# Patient Record
Sex: FEMALE | Race: WHITE | NOT HISPANIC OR LATINO
[De-identification: names, ages, dates, MRNs, and addresses within clinical notes are randomized per-mention and may not be internally consistent; named-entity substitution may affect disease eponyms.]

---

## 2017-01-04 ENCOUNTER — APPOINTMENT (OUTPATIENT)
Dept: RADIOLOGY | Facility: HOSPITAL | Age: 62
End: 2017-01-04

## 2017-01-04 ENCOUNTER — OUTPATIENT (OUTPATIENT)
Dept: OUTPATIENT SERVICES | Facility: HOSPITAL | Age: 62
LOS: 1 days | End: 2017-01-04
Payer: COMMERCIAL

## 2017-01-04 PROCEDURE — 73130 X-RAY EXAM OF HAND: CPT

## 2017-01-04 PROCEDURE — 73110 X-RAY EXAM OF WRIST: CPT

## 2017-01-04 PROCEDURE — 72100 X-RAY EXAM L-S SPINE 2/3 VWS: CPT

## 2017-01-04 PROCEDURE — 77080 DXA BONE DENSITY AXIAL: CPT

## 2017-01-17 ENCOUNTER — RESULT REVIEW (OUTPATIENT)
Age: 62
End: 2017-01-17

## 2017-01-18 ENCOUNTER — APPOINTMENT (OUTPATIENT)
Dept: OBGYN | Facility: CLINIC | Age: 62
End: 2017-01-18

## 2017-01-31 ENCOUNTER — APPOINTMENT (OUTPATIENT)
Dept: ORTHOPEDIC SURGERY | Facility: CLINIC | Age: 62
End: 2017-01-31

## 2017-01-31 RX ADMIN — LIDOCAINE HYDROCHLORIDE %: 10 INJECTION, SOLUTION INFILTRATION; PERINEURAL at 00:00

## 2017-01-31 RX ADMIN — METHYLPREDNISOLONE ACETATE MG/ML: 40 INJECTION, SUSPENSION INTRA-ARTICULAR; INTRALESIONAL; INTRAMUSCULAR; SOFT TISSUE at 00:00

## 2017-02-02 RX ORDER — METHYLPRED ACET/NACL,ISO-OS/PF 40 MG/ML
40 VIAL (ML) INJECTION
Qty: 1 | Refills: 0 | Status: COMPLETED | OUTPATIENT
Start: 2017-01-31

## 2017-02-02 RX ORDER — LIDOCAINE HYDROCHLORIDE 10 MG/ML
1 INJECTION, SOLUTION INFILTRATION; PERINEURAL
Refills: 0 | Status: COMPLETED | OUTPATIENT
Start: 2017-01-31

## 2017-02-27 ENCOUNTER — APPOINTMENT (OUTPATIENT)
Dept: MAMMOGRAPHY | Facility: HOSPITAL | Age: 62
End: 2017-02-27

## 2017-02-27 ENCOUNTER — OUTPATIENT (OUTPATIENT)
Dept: OUTPATIENT SERVICES | Facility: HOSPITAL | Age: 62
LOS: 1 days | End: 2017-02-27
Payer: COMMERCIAL

## 2017-02-27 ENCOUNTER — APPOINTMENT (OUTPATIENT)
Dept: ULTRASOUND IMAGING | Facility: HOSPITAL | Age: 62
End: 2017-02-27

## 2017-02-27 PROCEDURE — 76641 ULTRASOUND BREAST COMPLETE: CPT

## 2017-02-27 PROCEDURE — 77067 SCR MAMMO BI INCL CAD: CPT

## 2017-02-27 PROCEDURE — 77063 BREAST TOMOSYNTHESIS BI: CPT

## 2017-07-25 ENCOUNTER — APPOINTMENT (OUTPATIENT)
Dept: ORTHOPEDIC SURGERY | Facility: CLINIC | Age: 62
End: 2017-07-25
Payer: OTHER GOVERNMENT

## 2017-07-25 VITALS
HEIGHT: 63 IN | SYSTOLIC BLOOD PRESSURE: 118 MMHG | BODY MASS INDEX: 41.11 KG/M2 | WEIGHT: 232 LBS | HEART RATE: 76 BPM | DIASTOLIC BLOOD PRESSURE: 73 MMHG

## 2017-07-25 PROCEDURE — 99213 OFFICE O/P EST LOW 20 MIN: CPT | Mod: 25

## 2017-07-25 PROCEDURE — 73630 X-RAY EXAM OF FOOT: CPT | Mod: RT

## 2017-07-25 PROCEDURE — 20600 DRAIN/INJ JOINT/BURSA W/O US: CPT | Mod: RT

## 2017-11-17 ENCOUNTER — APPOINTMENT (OUTPATIENT)
Dept: ORTHOPEDIC SURGERY | Facility: CLINIC | Age: 62
End: 2017-11-17
Payer: OTHER GOVERNMENT

## 2017-11-17 VITALS
SYSTOLIC BLOOD PRESSURE: 152 MMHG | DIASTOLIC BLOOD PRESSURE: 85 MMHG | HEIGHT: 63 IN | BODY MASS INDEX: 41.11 KG/M2 | HEART RATE: 84 BPM | WEIGHT: 232 LBS

## 2017-11-17 PROCEDURE — 99214 OFFICE O/P EST MOD 30 MIN: CPT

## 2017-12-04 ENCOUNTER — APPOINTMENT (OUTPATIENT)
Dept: FAMILY MEDICINE | Facility: CLINIC | Age: 62
End: 2017-12-04
Payer: OTHER GOVERNMENT

## 2017-12-04 VITALS
WEIGHT: 224.5 LBS | HEART RATE: 83 BPM | DIASTOLIC BLOOD PRESSURE: 83 MMHG | BODY MASS INDEX: 39.78 KG/M2 | SYSTOLIC BLOOD PRESSURE: 112 MMHG | OXYGEN SATURATION: 97 % | RESPIRATION RATE: 16 BRPM | HEIGHT: 63 IN

## 2017-12-04 DIAGNOSIS — Z78.9 OTHER SPECIFIED HEALTH STATUS: ICD-10-CM

## 2017-12-04 DIAGNOSIS — Z87.891 PERSONAL HISTORY OF NICOTINE DEPENDENCE: ICD-10-CM

## 2017-12-04 DIAGNOSIS — Z87.09 PERSONAL HISTORY OF OTHER DISEASES OF THE RESPIRATORY SYSTEM: ICD-10-CM

## 2017-12-04 LAB — CYTOLOGY CVX/VAG DOC THIN PREP: NEGATIVE

## 2017-12-04 PROCEDURE — 99204 OFFICE O/P NEW MOD 45 MIN: CPT

## 2017-12-04 RX ORDER — FLUTICASONE PROPIONATE 50 UG/1
50 SPRAY, METERED NASAL
Qty: 48 | Refills: 0 | Status: COMPLETED | COMMUNITY
Start: 2017-06-05 | End: 2017-12-04

## 2017-12-07 PROBLEM — Z87.09 HISTORY OF ASTHMA: Status: RESOLVED | Noted: 2017-12-07 | Resolved: 2017-12-07

## 2017-12-16 ENCOUNTER — APPOINTMENT (OUTPATIENT)
Dept: ORTHOPEDIC SURGERY | Facility: CLINIC | Age: 62
End: 2017-12-16
Payer: OTHER GOVERNMENT

## 2017-12-16 DIAGNOSIS — M19.071 PRIMARY OSTEOARTHRITIS, RIGHT ANKLE AND FOOT: ICD-10-CM

## 2017-12-16 PROCEDURE — 20605 DRAIN/INJ JOINT/BURSA W/O US: CPT | Mod: RT

## 2017-12-16 PROCEDURE — 99214 OFFICE O/P EST MOD 30 MIN: CPT | Mod: 25

## 2017-12-16 RX ORDER — LIDOCAINE HYDROCHLORIDE 10 MG/ML
1 INJECTION, SOLUTION INFILTRATION; PERINEURAL
Refills: 0 | Status: COMPLETED | OUTPATIENT
Start: 2017-12-16

## 2017-12-16 RX ORDER — METHYLPRED ACET/NACL,ISO-OS/PF 40 MG/ML
40 VIAL (ML) INJECTION
Qty: 1 | Refills: 0 | Status: COMPLETED | OUTPATIENT
Start: 2017-12-16

## 2017-12-16 RX ADMIN — METHYLPREDNISOLONE ACETATE 0 MG/ML: 40 INJECTION, SUSPENSION INTRALESIONAL; INTRAMUSCULAR; INTRASYNOVIAL; SOFT TISSUE at 00:00

## 2017-12-16 RX ADMIN — LIDOCAINE HYDROCHLORIDE 0 %: 10 INJECTION, SOLUTION EPIDURAL; INFILTRATION; INTRACAUDAL; PERINEURAL at 00:00

## 2017-12-21 ENCOUNTER — RX RENEWAL (OUTPATIENT)
Age: 62
End: 2017-12-21

## 2018-01-08 ENCOUNTER — TRANSCRIPTION ENCOUNTER (OUTPATIENT)
Age: 63
End: 2018-01-08

## 2018-01-08 ENCOUNTER — RX RENEWAL (OUTPATIENT)
Age: 63
End: 2018-01-08

## 2018-02-16 ENCOUNTER — TRANSCRIPTION ENCOUNTER (OUTPATIENT)
Age: 63
End: 2018-02-16

## 2018-02-26 ENCOUNTER — APPOINTMENT (OUTPATIENT)
Dept: FAMILY MEDICINE | Facility: CLINIC | Age: 63
End: 2018-02-26

## 2018-02-26 ENCOUNTER — RX RENEWAL (OUTPATIENT)
Age: 63
End: 2018-02-26

## 2018-02-26 ENCOUNTER — APPOINTMENT (OUTPATIENT)
Dept: FAMILY MEDICINE | Facility: CLINIC | Age: 63
End: 2018-02-26
Payer: OTHER GOVERNMENT

## 2018-02-26 VITALS
SYSTOLIC BLOOD PRESSURE: 132 MMHG | BODY MASS INDEX: 39.54 KG/M2 | HEIGHT: 63 IN | OXYGEN SATURATION: 96 % | DIASTOLIC BLOOD PRESSURE: 84 MMHG | HEART RATE: 92 BPM | RESPIRATION RATE: 16 BRPM | WEIGHT: 223.13 LBS

## 2018-02-26 DIAGNOSIS — J30.9 ALLERGIC RHINITIS, UNSPECIFIED: ICD-10-CM

## 2018-02-26 LAB
BASOPHILS # BLD AUTO: 0.08 K/UL
BASOPHILS NFR BLD AUTO: 1.4 %
EOSINOPHIL # BLD AUTO: 0.09 K/UL
EOSINOPHIL NFR BLD AUTO: 1.5 %
HCT VFR BLD CALC: 39.2 %
HGB BLD-MCNC: 13.1 G/DL
IMM GRANULOCYTES NFR BLD AUTO: 0.2 %
LYMPHOCYTES # BLD AUTO: 1.56 K/UL
LYMPHOCYTES NFR BLD AUTO: 26.8 %
MAN DIFF?: NORMAL
MCHC RBC-ENTMCNC: 33 PG
MCHC RBC-ENTMCNC: 33.4 GM/DL
MCV RBC AUTO: 98.7 FL
MONOCYTES # BLD AUTO: 0.59 K/UL
MONOCYTES NFR BLD AUTO: 10.1 %
NEUTROPHILS # BLD AUTO: 3.49 K/UL
NEUTROPHILS NFR BLD AUTO: 60 %
PLATELET # BLD AUTO: 232 K/UL
RBC # BLD: 3.97 M/UL
RBC # FLD: 12.7 %
WBC # FLD AUTO: 5.82 K/UL

## 2018-02-26 PROCEDURE — 99214 OFFICE O/P EST MOD 30 MIN: CPT

## 2018-02-26 RX ORDER — OMEPRAZOLE 20 MG/1
20 CAPSULE, DELAYED RELEASE ORAL
Qty: 90 | Refills: 0 | Status: DISCONTINUED | COMMUNITY
Start: 2017-05-22 | End: 2018-02-26

## 2018-02-28 ENCOUNTER — APPOINTMENT (OUTPATIENT)
Dept: ULTRASOUND IMAGING | Facility: HOSPITAL | Age: 63
End: 2018-02-28
Payer: OTHER GOVERNMENT

## 2018-02-28 ENCOUNTER — OUTPATIENT (OUTPATIENT)
Dept: OUTPATIENT SERVICES | Facility: HOSPITAL | Age: 63
LOS: 1 days | End: 2018-02-28
Payer: COMMERCIAL

## 2018-02-28 ENCOUNTER — APPOINTMENT (OUTPATIENT)
Dept: MAMMOGRAPHY | Facility: HOSPITAL | Age: 63
End: 2018-02-28
Payer: OTHER GOVERNMENT

## 2018-02-28 DIAGNOSIS — Z00.8 ENCOUNTER FOR OTHER GENERAL EXAMINATION: ICD-10-CM

## 2018-02-28 PROCEDURE — 76641 ULTRASOUND BREAST COMPLETE: CPT | Mod: 26

## 2018-02-28 PROCEDURE — 77067 SCR MAMMO BI INCL CAD: CPT

## 2018-02-28 PROCEDURE — 77067 SCR MAMMO BI INCL CAD: CPT | Mod: 26

## 2018-02-28 PROCEDURE — 77063 BREAST TOMOSYNTHESIS BI: CPT | Mod: 26

## 2018-02-28 PROCEDURE — 77063 BREAST TOMOSYNTHESIS BI: CPT

## 2018-02-28 PROCEDURE — 76641 ULTRASOUND BREAST COMPLETE: CPT

## 2018-03-24 ENCOUNTER — RX RENEWAL (OUTPATIENT)
Age: 63
End: 2018-03-24

## 2018-03-24 LAB
25(OH)D3 SERPL-MCNC: 42.3 NG/ML
ALBUMIN SERPL ELPH-MCNC: 4.6 G/DL
ALP BLD-CCNC: 40 U/L
ALT SERPL-CCNC: 21 U/L
ANION GAP SERPL CALC-SCNC: 17 MMOL/L
AST SERPL-CCNC: 21 U/L
BILIRUB SERPL-MCNC: 0.3 MG/DL
BUN SERPL-MCNC: 16 MG/DL
CALCIUM SERPL-MCNC: 9.6 MG/DL
CHLORIDE SERPL-SCNC: 97 MMOL/L
CHOLEST SERPL-MCNC: 254 MG/DL
CHOLEST/HDLC SERPL: 2.9 RATIO
CO2 SERPL-SCNC: 23 MMOL/L
CREAT SERPL-MCNC: 0.77 MG/DL
GLUCOSE SERPL-MCNC: 97 MG/DL
HBA1C MFR BLD HPLC: 5.4 %
HDLC SERPL-MCNC: 89 MG/DL
LDLC SERPL CALC-MCNC: 112 MG/DL
POTASSIUM SERPL-SCNC: 4.2 MMOL/L
PROT SERPL-MCNC: 7.8 G/DL
SODIUM SERPL-SCNC: 137 MMOL/L
TRIGL SERPL-MCNC: 266 MG/DL
TSH SERPL-ACNC: 1.23 UIU/ML

## 2018-03-27 ENCOUNTER — RESULT REVIEW (OUTPATIENT)
Age: 63
End: 2018-03-27

## 2018-03-27 ENCOUNTER — APPOINTMENT (OUTPATIENT)
Dept: OBGYN | Facility: CLINIC | Age: 63
End: 2018-03-27
Payer: OTHER GOVERNMENT

## 2018-03-27 PROCEDURE — G0101: CPT

## 2018-05-15 ENCOUNTER — APPOINTMENT (OUTPATIENT)
Dept: ORTHOPEDIC SURGERY | Facility: CLINIC | Age: 63
End: 2018-05-15
Payer: OTHER GOVERNMENT

## 2018-05-15 VITALS
BODY MASS INDEX: 39.51 KG/M2 | HEART RATE: 73 BPM | HEIGHT: 63 IN | DIASTOLIC BLOOD PRESSURE: 71 MMHG | WEIGHT: 223 LBS | SYSTOLIC BLOOD PRESSURE: 119 MMHG

## 2018-05-15 PROCEDURE — 20605 DRAIN/INJ JOINT/BURSA W/O US: CPT | Mod: RT

## 2018-05-15 PROCEDURE — 99213 OFFICE O/P EST LOW 20 MIN: CPT | Mod: 25

## 2018-05-15 RX ADMIN — LIDOCAINE HYDROCHLORIDE %: 10 INJECTION, SOLUTION INFILTRATION; PERINEURAL at 00:00

## 2018-05-15 RX ADMIN — METHYLPREDNISOLONE ACETATE MG/ML: 40 INJECTION, SUSPENSION INTRA-ARTICULAR; INTRALESIONAL; INTRAMUSCULAR; SOFT TISSUE at 00:00

## 2018-05-18 RX ORDER — METHYLPRED ACET/NACL,ISO-OS/PF 40 MG/ML
40 VIAL (ML) INJECTION
Qty: 1 | Refills: 0 | Status: COMPLETED | OUTPATIENT
Start: 2018-05-15

## 2018-05-18 RX ORDER — LIDOCAINE HYDROCHLORIDE 10 MG/ML
1 INJECTION, SOLUTION INFILTRATION; PERINEURAL
Refills: 0 | Status: COMPLETED | OUTPATIENT
Start: 2018-05-15

## 2018-05-28 ENCOUNTER — INPATIENT (INPATIENT)
Facility: HOSPITAL | Age: 63
LOS: 5 days | Discharge: HOME | DRG: 392 | End: 2018-06-03
Attending: INTERNAL MEDICINE | Admitting: FAMILY MEDICINE
Payer: COMMERCIAL

## 2018-05-28 DIAGNOSIS — K57.92 DIVERTICULITIS OF INTESTINE, PART UNSPECIFIED, WITHOUT PERFORATION OR ABSCESS WITHOUT BLEEDING: ICD-10-CM

## 2018-05-29 DIAGNOSIS — K92.2 GASTROINTESTINAL HEMORRHAGE, UNSPECIFIED: ICD-10-CM

## 2018-05-29 PROCEDURE — 99223 1ST HOSP IP/OBS HIGH 75: CPT

## 2018-05-29 PROCEDURE — 74177 CT ABD & PELVIS W/CONTRAST: CPT | Mod: 26

## 2018-05-29 PROCEDURE — 93010 ELECTROCARDIOGRAM REPORT: CPT

## 2018-05-29 PROCEDURE — 71045 X-RAY EXAM CHEST 1 VIEW: CPT | Mod: 26

## 2018-05-30 PROCEDURE — 99233 SBSQ HOSP IP/OBS HIGH 50: CPT

## 2018-05-31 PROCEDURE — 99233 SBSQ HOSP IP/OBS HIGH 50: CPT

## 2018-06-01 PROCEDURE — 99233 SBSQ HOSP IP/OBS HIGH 50: CPT

## 2018-06-01 PROCEDURE — 99223 1ST HOSP IP/OBS HIGH 75: CPT

## 2018-06-02 PROCEDURE — 99233 SBSQ HOSP IP/OBS HIGH 50: CPT

## 2018-06-03 PROCEDURE — 83690 ASSAY OF LIPASE: CPT

## 2018-06-03 PROCEDURE — 85018 HEMOGLOBIN: CPT

## 2018-06-03 PROCEDURE — 86850 RBC ANTIBODY SCREEN: CPT

## 2018-06-03 PROCEDURE — 82150 ASSAY OF AMYLASE: CPT

## 2018-06-03 PROCEDURE — 80076 HEPATIC FUNCTION PANEL: CPT

## 2018-06-03 PROCEDURE — 96374 THER/PROPH/DIAG INJ IV PUSH: CPT

## 2018-06-03 PROCEDURE — 80053 COMPREHEN METABOLIC PANEL: CPT

## 2018-06-03 PROCEDURE — 93005 ELECTROCARDIOGRAM TRACING: CPT

## 2018-06-03 PROCEDURE — 85027 COMPLETE CBC AUTOMATED: CPT

## 2018-06-03 PROCEDURE — 85610 PROTHROMBIN TIME: CPT

## 2018-06-03 PROCEDURE — 84100 ASSAY OF PHOSPHORUS: CPT

## 2018-06-03 PROCEDURE — 85014 HEMATOCRIT: CPT

## 2018-06-03 PROCEDURE — 86900 BLOOD TYPING SEROLOGIC ABO: CPT

## 2018-06-03 PROCEDURE — 96361 HYDRATE IV INFUSION ADD-ON: CPT

## 2018-06-03 PROCEDURE — 99239 HOSP IP/OBS DSCHRG MGMT >30: CPT

## 2018-06-03 PROCEDURE — 99285 EMERGENCY DEPT VISIT HI MDM: CPT | Mod: 25

## 2018-06-03 PROCEDURE — 85730 THROMBOPLASTIN TIME PARTIAL: CPT

## 2018-06-03 PROCEDURE — 83735 ASSAY OF MAGNESIUM: CPT

## 2018-06-03 PROCEDURE — 71045 X-RAY EXAM CHEST 1 VIEW: CPT

## 2018-06-03 PROCEDURE — 96375 TX/PRO/DX INJ NEW DRUG ADDON: CPT

## 2018-06-03 PROCEDURE — 99232 SBSQ HOSP IP/OBS MODERATE 35: CPT

## 2018-06-03 PROCEDURE — 82272 OCCULT BLD FECES 1-3 TESTS: CPT

## 2018-06-03 PROCEDURE — 74177 CT ABD & PELVIS W/CONTRAST: CPT

## 2018-06-03 PROCEDURE — 80048 BASIC METABOLIC PNL TOTAL CA: CPT

## 2018-06-06 ENCOUNTER — APPOINTMENT (OUTPATIENT)
Dept: FAMILY MEDICINE | Facility: CLINIC | Age: 63
End: 2018-06-06
Payer: OTHER GOVERNMENT

## 2018-06-06 VITALS
OXYGEN SATURATION: 96 % | SYSTOLIC BLOOD PRESSURE: 130 MMHG | HEART RATE: 73 BPM | DIASTOLIC BLOOD PRESSURE: 84 MMHG | BODY MASS INDEX: 39.34 KG/M2 | WEIGHT: 222.06 LBS | RESPIRATION RATE: 14 BRPM | HEIGHT: 63 IN

## 2018-06-06 PROCEDURE — 99214 OFFICE O/P EST MOD 30 MIN: CPT

## 2018-06-11 NOTE — COUNSELING
[Weight management counseling provided] : Weight management [Healthy eating counseling provided] : healthy eating [Activity counseling provided] : activity [Engage in a relaxing activity] : Engage in a relaxing activity

## 2018-06-11 NOTE — HISTORY OF PRESENT ILLNESS
[FreeTextEntry1] : Patient presents for f/u hospitalization at [facility] due tocolitis, admitted 5/29 memorial day d/c until Sunday. Now on low residue diet. Slade saw in hospital, will f/u 6/14/18. Last colonoscopy 2016 [facility], will try Slade now. \par flagyl, cipro prescribed. Feeling better now, no fevers. Some diarrhea (less than before). Tired eliminating night shade vegetables without improvement x 1 month. Enjoys going to their home in [place] with , they plan to retire there. Blood work and imaging from hospital reviewed with patient during visit. ALT elevated, will repeat. \par \par ROS: negative except as noted above.\par cipro-risk of tendon rupture d/w patient, if sx see doctor immediately or ER\par \par oral one more day\par \par tried eliminating night shades  x 1 month without change in symptoms. 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 His agueda uses it as a source of income. \par \par still having chronic diarrhea, has started iron at home again,advised to continue. will f/u with slade

## 2018-06-11 NOTE — PHYSICAL EXAM
[No Acute Distress] : no acute distress [Well Nourished] : well nourished [Well Developed] : well developed [Well-Appearing] : well-appearing [Normal Sclera/Conjunctiva] : normal sclera/conjunctiva [No Respiratory Distress] : no respiratory distress  [Clear to Auscultation] : lungs were clear to auscultation bilaterally [No Accessory Muscle Use] : no accessory muscle use [Normal Rate] : normal rate  [Regular Rhythm] : with a regular rhythm [Normal S1, S2] : normal S1 and S2 [Soft] : abdomen soft [Non-distended] : non-distended [No Masses] : no abdominal mass palpated [No HSM] : no HSM [Speech Grossly Normal] : speech grossly normal [Memory Grossly Normal] : memory grossly normal [Normal Affect] : the affect was normal [Alert and Oriented x3] : oriented to person, place, and time [Normal Insight/Judgement] : insight and judgment were intact [de-identified] : mild tenderness to deep palpation LLQ

## 2018-07-24 ENCOUNTER — APPOINTMENT (OUTPATIENT)
Dept: SURGERY | Facility: CLINIC | Age: 63
End: 2018-07-24
Payer: OTHER GOVERNMENT

## 2018-07-24 VITALS
SYSTOLIC BLOOD PRESSURE: 160 MMHG | OXYGEN SATURATION: 99 % | DIASTOLIC BLOOD PRESSURE: 86 MMHG | RESPIRATION RATE: 15 BRPM | TEMPERATURE: 97.4 F | HEART RATE: 97 BPM

## 2018-07-24 DIAGNOSIS — Z87.19 PERSONAL HISTORY OF OTHER DISEASES OF THE DIGESTIVE SYSTEM: ICD-10-CM

## 2018-07-24 PROCEDURE — 99202 OFFICE O/P NEW SF 15 MIN: CPT | Mod: 25

## 2018-07-24 PROCEDURE — 10061 I&D ABSCESS COMP/MULTIPLE: CPT

## 2018-07-24 RX ORDER — MAGNESIUM OXIDE 500 MG
500 TABLET ORAL
Refills: 0 | Status: ACTIVE | COMMUNITY

## 2018-07-24 RX ORDER — FERROUS SULFATE 325(65) MG
325 (65 FE) TABLET ORAL
Refills: 0 | Status: ACTIVE | COMMUNITY

## 2018-07-24 RX ORDER — ESTRADIOL 1 MG/1
1 TABLET ORAL
Qty: 30 | Refills: 0 | Status: ACTIVE | COMMUNITY
Start: 2018-06-26

## 2018-07-24 RX ORDER — BACILLUS COAGULANS/INULIN 1B-250 MG
CAPSULE ORAL
Refills: 0 | Status: ACTIVE | COMMUNITY

## 2018-07-24 RX ORDER — METRONIDAZOLE 500 MG/1
500 TABLET ORAL
Qty: 12 | Refills: 0 | Status: DISCONTINUED | COMMUNITY
Start: 2018-06-03

## 2018-07-24 RX ORDER — MULTIVIT-MIN/IRON/FOLIC ACID/K 18-600-40
500 CAPSULE ORAL
Refills: 0 | Status: ACTIVE | COMMUNITY

## 2018-07-24 RX ORDER — SULFAMETHOXAZOLE AND TRIMETHOPRIM 800; 160 MG/1; MG/1
800-160 TABLET ORAL
Qty: 14 | Refills: 0 | Status: DISCONTINUED | COMMUNITY
Start: 2018-07-17

## 2018-07-24 RX ORDER — ASPIRIN ENTERIC COATED TABLETS 81 MG 81 MG/1
81 TABLET, DELAYED RELEASE ORAL
Refills: 0 | Status: ACTIVE | COMMUNITY

## 2018-07-24 RX ORDER — CIPROFLOXACIN HYDROCHLORIDE 500 MG/1
500 TABLET, FILM COATED ORAL
Qty: 8 | Refills: 0 | Status: DISCONTINUED | COMMUNITY
Start: 2018-06-03

## 2018-07-31 ENCOUNTER — APPOINTMENT (OUTPATIENT)
Dept: SURGERY | Facility: CLINIC | Age: 63
End: 2018-07-31
Payer: MEDICARE

## 2018-07-31 VITALS
DIASTOLIC BLOOD PRESSURE: 88 MMHG | OXYGEN SATURATION: 98 % | HEART RATE: 74 BPM | TEMPERATURE: 97.7 F | RESPIRATION RATE: 16 BRPM | SYSTOLIC BLOOD PRESSURE: 140 MMHG

## 2018-07-31 DIAGNOSIS — L02.91 CUTANEOUS ABSCESS, UNSPECIFIED: ICD-10-CM

## 2018-07-31 PROCEDURE — 99024 POSTOP FOLLOW-UP VISIT: CPT

## 2018-07-31 RX ORDER — FLUTICASONE PROPIONATE 0.05 MG/G
0.01 OINTMENT TOPICAL
Qty: 30 | Refills: 0 | Status: DISCONTINUED | COMMUNITY
Start: 2017-07-25 | End: 2018-07-31

## 2018-08-27 LAB
ALP BLD-CCNC: 44 U/L
ALT SERPL-CCNC: 14 U/L
AST SERPL-CCNC: 19 U/L

## 2018-09-07 ENCOUNTER — APPOINTMENT (OUTPATIENT)
Dept: FAMILY MEDICINE | Facility: CLINIC | Age: 63
End: 2018-09-07
Payer: OTHER GOVERNMENT

## 2018-09-07 VITALS
OXYGEN SATURATION: 96 % | BODY MASS INDEX: 40.12 KG/M2 | SYSTOLIC BLOOD PRESSURE: 124 MMHG | WEIGHT: 226.44 LBS | DIASTOLIC BLOOD PRESSURE: 70 MMHG | HEART RATE: 79 BPM | HEIGHT: 63 IN | RESPIRATION RATE: 16 BRPM

## 2018-09-07 DIAGNOSIS — E03.9 HYPOTHYROIDISM, UNSPECIFIED: ICD-10-CM

## 2018-09-07 DIAGNOSIS — E78.1 PURE HYPERGLYCERIDEMIA: ICD-10-CM

## 2018-09-07 DIAGNOSIS — Z86.39 PERSONAL HISTORY OF OTHER ENDOCRINE, NUTRITIONAL AND METABOLIC DISEASE: ICD-10-CM

## 2018-09-07 PROCEDURE — 99214 OFFICE O/P EST MOD 30 MIN: CPT

## 2018-09-10 PROBLEM — Z86.39 HISTORY OF OBESITY: Status: RESOLVED | Noted: 2018-02-27 | Resolved: 2018-09-10

## 2018-09-10 NOTE — PHYSICAL EXAM
[No Acute Distress] : no acute distress [Well Nourished] : well nourished [Well Developed] : well developed [Well-Appearing] : well-appearing [Normal Voice/Communication] : normal voice/communication [Normal Sclera/Conjunctiva] : normal sclera/conjunctiva [Normal Outer Ear/Nose] : the outer ears and nose were normal in appearance [No Respiratory Distress] : no respiratory distress  [Clear to Auscultation] : lungs were clear to auscultation bilaterally [No Accessory Muscle Use] : no accessory muscle use [Normal Rate] : normal rate  [Regular Rhythm] : with a regular rhythm [Normal S1, S2] : normal S1 and S2 [de-identified] : +small 0.5 cm indurated area under right breast, minimal post inflammatory hyperpigmentation, min tenderness

## 2018-09-10 NOTE — ASSESSMENT
[FreeTextEntry1] : patient to start insurance run weight loss program x 1 year, had lost weight in past on weight watchers, hopeful\par RTO 4-6 months for f/u hypertriglyceridemia, HTN wt f/u

## 2018-09-10 NOTE — HISTORY OF PRESENT ILLNESS
[FreeTextEntry1] : Patient presents for f/u appointment, f/u HTN, obesity . Feels well. Spent much of the summer in [place]. Had an abscess under right breast, started while still in [place]. Was given sulfa abx by doctor up there, then saw breast surgeon here, was drained.  Still slightly tender to palpation. No fever, no chills.  Has house in [place], [place], planning to move there permanently in 2020. \par \par ROS: negative except as noted above\par

## 2018-10-23 ENCOUNTER — RX RENEWAL (OUTPATIENT)
Age: 63
End: 2018-10-23

## 2018-10-24 ENCOUNTER — TRANSCRIPTION ENCOUNTER (OUTPATIENT)
Age: 63
End: 2018-10-24

## 2018-11-06 ENCOUNTER — APPOINTMENT (OUTPATIENT)
Dept: ORTHOPEDIC SURGERY | Facility: CLINIC | Age: 63
End: 2018-11-06
Payer: OTHER GOVERNMENT

## 2018-11-06 VITALS — SYSTOLIC BLOOD PRESSURE: 137 MMHG | HEART RATE: 83 BPM | DIASTOLIC BLOOD PRESSURE: 85 MMHG

## 2018-11-06 PROCEDURE — 20600 DRAIN/INJ JOINT/BURSA W/O US: CPT | Mod: RT

## 2018-11-06 PROCEDURE — 99214 OFFICE O/P EST MOD 30 MIN: CPT | Mod: 25

## 2018-11-06 PROCEDURE — 73630 X-RAY EXAM OF FOOT: CPT | Mod: RT

## 2019-01-08 ENCOUNTER — APPOINTMENT (OUTPATIENT)
Dept: RADIOLOGY | Facility: HOSPITAL | Age: 64
End: 2019-01-08
Payer: COMMERCIAL

## 2019-01-08 ENCOUNTER — OUTPATIENT (OUTPATIENT)
Dept: OUTPATIENT SERVICES | Facility: HOSPITAL | Age: 64
LOS: 1 days | End: 2019-01-08
Payer: COMMERCIAL

## 2019-01-08 ENCOUNTER — APPOINTMENT (OUTPATIENT)
Dept: MRI IMAGING | Facility: HOSPITAL | Age: 64
End: 2019-01-08
Payer: COMMERCIAL

## 2019-01-08 DIAGNOSIS — Z00.8 ENCOUNTER FOR OTHER GENERAL EXAMINATION: ICD-10-CM

## 2019-01-08 PROCEDURE — 72148 MRI LUMBAR SPINE W/O DYE: CPT

## 2019-01-08 PROCEDURE — 72040 X-RAY EXAM NECK SPINE 2-3 VW: CPT

## 2019-01-08 PROCEDURE — 72148 MRI LUMBAR SPINE W/O DYE: CPT | Mod: 26

## 2019-01-08 PROCEDURE — 72040 X-RAY EXAM NECK SPINE 2-3 VW: CPT | Mod: 26

## 2019-01-09 ENCOUNTER — RX RENEWAL (OUTPATIENT)
Age: 64
End: 2019-01-09

## 2019-01-22 ENCOUNTER — OUTPATIENT (OUTPATIENT)
Dept: OUTPATIENT SERVICES | Facility: HOSPITAL | Age: 64
LOS: 1 days | End: 2019-01-22
Payer: COMMERCIAL

## 2019-01-22 ENCOUNTER — APPOINTMENT (OUTPATIENT)
Dept: MRI IMAGING | Facility: HOSPITAL | Age: 64
End: 2019-01-22
Payer: OTHER GOVERNMENT

## 2019-01-22 DIAGNOSIS — M54.12 RADICULOPATHY, CERVICAL REGION: ICD-10-CM

## 2019-01-22 PROCEDURE — 72141 MRI NECK SPINE W/O DYE: CPT

## 2019-01-22 PROCEDURE — 72141 MRI NECK SPINE W/O DYE: CPT | Mod: 26

## 2019-02-20 ENCOUNTER — RX RENEWAL (OUTPATIENT)
Age: 64
End: 2019-02-20

## 2019-02-27 LAB — TSH SERPL-ACNC: 1.89 UIU/ML

## 2019-02-28 ENCOUNTER — TRANSCRIPTION ENCOUNTER (OUTPATIENT)
Age: 64
End: 2019-02-28

## 2019-03-13 ENCOUNTER — APPOINTMENT (OUTPATIENT)
Dept: FAMILY MEDICINE | Facility: CLINIC | Age: 64
End: 2019-03-13
Payer: OTHER GOVERNMENT

## 2019-03-13 VITALS
DIASTOLIC BLOOD PRESSURE: 68 MMHG | HEART RATE: 88 BPM | BODY MASS INDEX: 40.98 KG/M2 | SYSTOLIC BLOOD PRESSURE: 120 MMHG | WEIGHT: 231.31 LBS | OXYGEN SATURATION: 95 % | RESPIRATION RATE: 16 BRPM | HEIGHT: 63 IN

## 2019-03-13 DIAGNOSIS — E78.5 HYPERLIPIDEMIA, UNSPECIFIED: ICD-10-CM

## 2019-03-13 DIAGNOSIS — I10 ESSENTIAL (PRIMARY) HYPERTENSION: ICD-10-CM

## 2019-03-13 LAB
CHOLEST SERPL-MCNC: 276 MG/DL
CHOLEST/HDLC SERPL: 2.5 RATIO
HDLC SERPL-MCNC: 110 MG/DL
LDLC SERPL CALC-MCNC: 137 MG/DL
TRIGL SERPL-MCNC: 146 MG/DL

## 2019-03-13 PROCEDURE — 99214 OFFICE O/P EST MOD 30 MIN: CPT

## 2019-03-14 ENCOUNTER — INPATIENT (INPATIENT)
Facility: HOSPITAL | Age: 64
LOS: 4 days | Discharge: HOME | DRG: 872 | End: 2019-03-19
Attending: INTERNAL MEDICINE | Admitting: HOSPITALIST
Payer: COMMERCIAL

## 2019-03-14 VITALS
SYSTOLIC BLOOD PRESSURE: 163 MMHG | HEART RATE: 103 BPM | RESPIRATION RATE: 18 BRPM | HEIGHT: 63 IN | OXYGEN SATURATION: 97 % | TEMPERATURE: 98 F | WEIGHT: 225.97 LBS | DIASTOLIC BLOOD PRESSURE: 90 MMHG

## 2019-03-14 DIAGNOSIS — K57.92 DIVERTICULITIS OF INTESTINE, PART UNSPECIFIED, WITHOUT PERFORATION OR ABSCESS WITHOUT BLEEDING: ICD-10-CM

## 2019-03-14 LAB
ALBUMIN SERPL ELPH-MCNC: 3.8 G/DL — SIGNIFICANT CHANGE UP (ref 3.3–5)
ALP SERPL-CCNC: 66 U/L — SIGNIFICANT CHANGE UP (ref 40–120)
ALT FLD-CCNC: 37 U/L DA — SIGNIFICANT CHANGE UP (ref 10–45)
ANION GAP SERPL CALC-SCNC: 17 MMOL/L — SIGNIFICANT CHANGE UP (ref 5–17)
AST SERPL-CCNC: 24 U/L — SIGNIFICANT CHANGE UP (ref 10–40)
BASOPHILS # BLD AUTO: 0.1 K/UL — SIGNIFICANT CHANGE UP (ref 0–0.2)
BASOPHILS NFR BLD AUTO: 1.1 % — SIGNIFICANT CHANGE UP (ref 0–2)
BILIRUB SERPL-MCNC: 0.4 MG/DL — SIGNIFICANT CHANGE UP (ref 0.2–1.2)
BUN SERPL-MCNC: 20 MG/DL — SIGNIFICANT CHANGE UP (ref 7–23)
CALCIUM SERPL-MCNC: 10.3 MG/DL — SIGNIFICANT CHANGE UP (ref 8.4–10.5)
CHLORIDE SERPL-SCNC: 101 MMOL/L — SIGNIFICANT CHANGE UP (ref 96–108)
CO2 SERPL-SCNC: 23 MMOL/L — SIGNIFICANT CHANGE UP (ref 22–31)
CREAT SERPL-MCNC: 0.87 MG/DL — SIGNIFICANT CHANGE UP (ref 0.5–1.3)
EOSINOPHIL # BLD AUTO: 0 K/UL — SIGNIFICANT CHANGE UP (ref 0–0.5)
EOSINOPHIL NFR BLD AUTO: 0.3 % — SIGNIFICANT CHANGE UP (ref 0–6)
GLUCOSE SERPL-MCNC: 107 MG/DL — HIGH (ref 70–99)
HCT VFR BLD CALC: 41 % — SIGNIFICANT CHANGE UP (ref 34.5–45)
HGB BLD-MCNC: 13.8 G/DL — SIGNIFICANT CHANGE UP (ref 11.5–15.5)
LYMPHOCYTES # BLD AUTO: 1.3 K/UL — SIGNIFICANT CHANGE UP (ref 1–3.3)
LYMPHOCYTES # BLD AUTO: 10.5 % — LOW (ref 13–44)
MCHC RBC-ENTMCNC: 33.4 PG — SIGNIFICANT CHANGE UP (ref 27–34)
MCHC RBC-ENTMCNC: 33.7 GM/DL — SIGNIFICANT CHANGE UP (ref 32–36)
MCV RBC AUTO: 99.1 FL — SIGNIFICANT CHANGE UP (ref 80–100)
MONOCYTES # BLD AUTO: 0.6 K/UL — SIGNIFICANT CHANGE UP (ref 0–0.9)
MONOCYTES NFR BLD AUTO: 5.4 % — SIGNIFICANT CHANGE UP (ref 2–14)
NEUTROPHILS # BLD AUTO: 9.9 K/UL — HIGH (ref 1.8–7.4)
NEUTROPHILS NFR BLD AUTO: 82.7 % — HIGH (ref 43–77)
PLATELET # BLD AUTO: 222 K/UL — SIGNIFICANT CHANGE UP (ref 150–400)
POTASSIUM SERPL-MCNC: 4 MMOL/L — SIGNIFICANT CHANGE UP (ref 3.5–5.3)
POTASSIUM SERPL-SCNC: 4 MMOL/L — SIGNIFICANT CHANGE UP (ref 3.5–5.3)
PROT SERPL-MCNC: 8.5 G/DL — HIGH (ref 6–8.3)
RBC # BLD: 4.14 M/UL — SIGNIFICANT CHANGE UP (ref 3.8–5.2)
RBC # FLD: 11.6 % — SIGNIFICANT CHANGE UP (ref 10.3–14.5)
SODIUM SERPL-SCNC: 141 MMOL/L — SIGNIFICANT CHANGE UP (ref 135–145)
WBC # BLD: 12 K/UL — HIGH (ref 3.8–10.5)
WBC # FLD AUTO: 12 K/UL — HIGH (ref 3.8–10.5)

## 2019-03-14 PROCEDURE — 74177 CT ABD & PELVIS W/CONTRAST: CPT | Mod: 26

## 2019-03-14 PROCEDURE — 99285 EMERGENCY DEPT VISIT HI MDM: CPT

## 2019-03-14 PROCEDURE — 99223 1ST HOSP IP/OBS HIGH 75: CPT

## 2019-03-14 RX ORDER — LEVOTHYROXINE SODIUM 125 MCG
125 TABLET ORAL DAILY
Qty: 0 | Refills: 0 | Status: DISCONTINUED | OUTPATIENT
Start: 2019-03-14 | End: 2019-03-19

## 2019-03-14 RX ORDER — MORPHINE SULFATE 50 MG/1
4 CAPSULE, EXTENDED RELEASE ORAL ONCE
Qty: 0 | Refills: 0 | Status: DISCONTINUED | OUTPATIENT
Start: 2019-03-14 | End: 2019-03-14

## 2019-03-14 RX ORDER — FUROSEMIDE 40 MG
20 TABLET ORAL DAILY
Qty: 0 | Refills: 0 | Status: DISCONTINUED | OUTPATIENT
Start: 2019-03-14 | End: 2019-03-19

## 2019-03-14 RX ORDER — ONDANSETRON 8 MG/1
4 TABLET, FILM COATED ORAL ONCE
Qty: 0 | Refills: 0 | Status: COMPLETED | OUTPATIENT
Start: 2019-03-14 | End: 2019-03-14

## 2019-03-14 RX ORDER — SODIUM CHLORIDE 9 MG/ML
1000 INJECTION INTRAMUSCULAR; INTRAVENOUS; SUBCUTANEOUS ONCE
Qty: 0 | Refills: 0 | Status: COMPLETED | OUTPATIENT
Start: 2019-03-14 | End: 2019-03-14

## 2019-03-14 RX ORDER — LISINOPRIL 2.5 MG/1
40 TABLET ORAL DAILY
Qty: 0 | Refills: 0 | Status: DISCONTINUED | OUTPATIENT
Start: 2019-03-14 | End: 2019-03-19

## 2019-03-14 RX ORDER — CIPROFLOXACIN LACTATE 400MG/40ML
400 VIAL (ML) INTRAVENOUS ONCE
Qty: 0 | Refills: 0 | Status: COMPLETED | OUTPATIENT
Start: 2019-03-14 | End: 2019-03-14

## 2019-03-14 RX ORDER — METRONIDAZOLE 500 MG
500 TABLET ORAL ONCE
Qty: 0 | Refills: 0 | Status: COMPLETED | OUTPATIENT
Start: 2019-03-14 | End: 2019-03-14

## 2019-03-14 RX ORDER — SODIUM CHLORIDE 9 MG/ML
1000 INJECTION, SOLUTION INTRAVENOUS
Qty: 0 | Refills: 0 | Status: DISCONTINUED | OUTPATIENT
Start: 2019-03-14 | End: 2019-03-18

## 2019-03-14 RX ORDER — FUROSEMIDE 40 MG
1 TABLET ORAL
Qty: 0 | Refills: 0 | COMMUNITY

## 2019-03-14 RX ORDER — ONDANSETRON 8 MG/1
4 TABLET, FILM COATED ORAL EVERY 6 HOURS
Qty: 0 | Refills: 0 | Status: DISCONTINUED | OUTPATIENT
Start: 2019-03-14 | End: 2019-03-19

## 2019-03-14 RX ORDER — PANTOPRAZOLE SODIUM 20 MG/1
40 TABLET, DELAYED RELEASE ORAL ONCE
Qty: 0 | Refills: 0 | Status: COMPLETED | OUTPATIENT
Start: 2019-03-14 | End: 2019-03-15

## 2019-03-14 RX ORDER — METRONIDAZOLE 500 MG
500 TABLET ORAL EVERY 8 HOURS
Qty: 0 | Refills: 0 | Status: DISCONTINUED | OUTPATIENT
Start: 2019-03-15 | End: 2019-03-19

## 2019-03-14 RX ORDER — LATANOPROST 0.05 MG/ML
1 SOLUTION/ DROPS OPHTHALMIC; TOPICAL AT BEDTIME
Qty: 0 | Refills: 0 | Status: DISCONTINUED | OUTPATIENT
Start: 2019-03-14 | End: 2019-03-19

## 2019-03-14 RX ORDER — ICOSAPENT ETHYL 500 MG/1
2 CAPSULE, LIQUID FILLED ORAL
Qty: 0 | Refills: 0 | COMMUNITY

## 2019-03-14 RX ORDER — ENOXAPARIN SODIUM 100 MG/ML
40 INJECTION SUBCUTANEOUS EVERY 24 HOURS
Qty: 0 | Refills: 0 | Status: DISCONTINUED | OUTPATIENT
Start: 2019-03-14 | End: 2019-03-19

## 2019-03-14 RX ORDER — ASPIRIN/CALCIUM CARB/MAGNESIUM 324 MG
81 TABLET ORAL DAILY
Qty: 0 | Refills: 0 | Status: DISCONTINUED | OUTPATIENT
Start: 2019-03-14 | End: 2019-03-19

## 2019-03-14 RX ORDER — MAGNESIUM OXIDE 400 MG ORAL TABLET 241.3 MG
400 TABLET ORAL DAILY
Qty: 0 | Refills: 0 | Status: DISCONTINUED | OUTPATIENT
Start: 2019-03-14 | End: 2019-03-19

## 2019-03-14 RX ORDER — ACETAMINOPHEN 500 MG
650 TABLET ORAL EVERY 6 HOURS
Qty: 0 | Refills: 0 | Status: DISCONTINUED | OUTPATIENT
Start: 2019-03-14 | End: 2019-03-19

## 2019-03-14 RX ORDER — LEVOTHYROXINE SODIUM 125 MCG
1 TABLET ORAL
Qty: 0 | Refills: 0 | COMMUNITY

## 2019-03-14 RX ORDER — PANTOPRAZOLE SODIUM 20 MG/1
40 TABLET, DELAYED RELEASE ORAL
Qty: 0 | Refills: 0 | Status: DISCONTINUED | OUTPATIENT
Start: 2019-03-15 | End: 2019-03-19

## 2019-03-14 RX ORDER — OMEPRAZOLE 10 MG/1
1 CAPSULE, DELAYED RELEASE ORAL
Qty: 0 | Refills: 0 | COMMUNITY

## 2019-03-14 RX ORDER — NISOLDIPINE 25.5 MG/1
1 TABLET, FILM COATED, EXTENDED RELEASE ORAL
Qty: 0 | Refills: 0 | COMMUNITY

## 2019-03-14 RX ORDER — HYDROXYCHLOROQUINE SULFATE 200 MG
400 TABLET ORAL DAILY
Qty: 0 | Refills: 0 | Status: DISCONTINUED | OUTPATIENT
Start: 2019-03-14 | End: 2019-03-19

## 2019-03-14 RX ORDER — TRAVOPROST 0.04 MG/ML
1 SOLUTION/ DROPS OPHTHALMIC
Qty: 0 | Refills: 0 | COMMUNITY

## 2019-03-14 RX ORDER — MORPHINE SULFATE 50 MG/1
4 CAPSULE, EXTENDED RELEASE ORAL
Qty: 0 | Refills: 0 | Status: DISCONTINUED | OUTPATIENT
Start: 2019-03-14 | End: 2019-03-19

## 2019-03-14 RX ORDER — ASPIRIN/CALCIUM CARB/MAGNESIUM 324 MG
1 TABLET ORAL
Qty: 0 | Refills: 0 | COMMUNITY

## 2019-03-14 RX ORDER — MAGNESIUM OXIDE 400 MG ORAL TABLET 241.3 MG
1 TABLET ORAL
Qty: 0 | Refills: 0 | COMMUNITY

## 2019-03-14 RX ORDER — CIPROFLOXACIN LACTATE 400MG/40ML
400 VIAL (ML) INTRAVENOUS EVERY 12 HOURS
Qty: 0 | Refills: 0 | Status: DISCONTINUED | OUTPATIENT
Start: 2019-03-15 | End: 2019-03-19

## 2019-03-14 RX ORDER — LISINOPRIL 2.5 MG/1
1 TABLET ORAL
Qty: 0 | Refills: 0 | COMMUNITY

## 2019-03-14 RX ADMIN — ONDANSETRON 4 MILLIGRAM(S): 8 TABLET, FILM COATED ORAL at 19:22

## 2019-03-14 RX ADMIN — Medication 200 MILLIGRAM(S): at 19:25

## 2019-03-14 RX ADMIN — MORPHINE SULFATE 4 MILLIGRAM(S): 50 CAPSULE, EXTENDED RELEASE ORAL at 19:23

## 2019-03-14 RX ADMIN — MORPHINE SULFATE 4 MILLIGRAM(S): 50 CAPSULE, EXTENDED RELEASE ORAL at 21:45

## 2019-03-14 RX ADMIN — Medication 100 MILLIGRAM(S): at 21:06

## 2019-03-14 RX ADMIN — Medication 400 MILLIGRAM(S): at 21:04

## 2019-03-14 RX ADMIN — ONDANSETRON 4 MILLIGRAM(S): 8 TABLET, FILM COATED ORAL at 16:22

## 2019-03-14 RX ADMIN — SODIUM CHLORIDE 2000 MILLILITER(S): 9 INJECTION INTRAMUSCULAR; INTRAVENOUS; SUBCUTANEOUS at 19:22

## 2019-03-14 RX ADMIN — MORPHINE SULFATE 4 MILLIGRAM(S): 50 CAPSULE, EXTENDED RELEASE ORAL at 19:53

## 2019-03-14 RX ADMIN — MORPHINE SULFATE 4 MILLIGRAM(S): 50 CAPSULE, EXTENDED RELEASE ORAL at 16:51

## 2019-03-14 RX ADMIN — MORPHINE SULFATE 4 MILLIGRAM(S): 50 CAPSULE, EXTENDED RELEASE ORAL at 16:36

## 2019-03-14 RX ADMIN — MORPHINE SULFATE 4 MILLIGRAM(S): 50 CAPSULE, EXTENDED RELEASE ORAL at 20:53

## 2019-03-14 RX ADMIN — ONDANSETRON 4 MILLIGRAM(S): 8 TABLET, FILM COATED ORAL at 14:30

## 2019-03-14 RX ADMIN — SODIUM CHLORIDE 2000 MILLILITER(S): 9 INJECTION INTRAMUSCULAR; INTRAVENOUS; SUBCUTANEOUS at 14:30

## 2019-03-14 RX ADMIN — SODIUM CHLORIDE 1000 MILLILITER(S): 9 INJECTION INTRAMUSCULAR; INTRAVENOUS; SUBCUTANEOUS at 20:20

## 2019-03-14 NOTE — H&P ADULT - NSHPPHYSICALEXAM_GEN_ALL_CORE
Vital Signs (24 Hrs):  T(C): 36.6 (03-15-19 @ 02:15), Max: 36.8 (19 @ 14:14)  HR: 91 (03-15-19 @ 02:15) (86 - 103)  BP: 159/90 (03-15-19 @ 02:15) (143/99 - 182/90)  RR: 18 (03-15-19 @ 02:15) (17 - 20)  SpO2: 95% (03-15-19 @ 02:15) (95% - 100%)  Daily Height in cm: 160.02 (14 Mar 2019 14:14)    Daily Weight in k.1 (15 Mar 2019 02:15)

## 2019-03-14 NOTE — ED ADULT NURSE NOTE - PSH
Dermatofibroma of back    Detached retina, right    S/P cataract surgery    S/P cervical spinal fusion    S/P  section    S/P colonoscopy    S/P hemorrhoidectomy    S/P hysterectomy with oophorectomy

## 2019-03-14 NOTE — ED ADULT NURSE NOTE - NSFALLRSKUNASSIST_ED_ALL_ED
DATE OF ADMISSION: 2017

 

TIME SEEN:  2300

 

CHIEF COMPLAINT:  Left hip pain after a fall.  

 

HISTORY OF PRESENT ILLNESS:  The patient is a 76-year-old female with a history of hypertension, YAMILEX
D, arthritis, right breast carcinoma status post partial mastectomy and axillary dissection who pres
ents to the emergency department complaining of left hip pain after she fell down.  The patient bent
 down to  with her keys when she accidentally fell, landing on her left hip.  She immediately
 started feeling pain on her left hip, and she was not able to get herself up.  Denied hitting her h
ead or loss of consciousness.  She also denied any chest pain, shortness of breath, palpitation, lig
htheadedness.

 

When she presented to the ER, blood pressure 165/75, heart rate 97, respiratory rate 18, temperature
 98.3, oxygen saturation 95%.  Imaging shows a nondisplaced left intertrochanteric fracture involvin
g the proximal left femur.  The ER, placed a consult to the on-call orthopedic surgeon, Dr. Lea seo. 

 

REVIEW OF SYSTEMS:  A 12-point review was performed and is negative except as mentioned in the HPI.

 

PAST MEDICAL HISTORY:  As per HPI.

 

PAST SURGICAL HISTORY:  

1.  Partial right breast mastectomy and axillary lymph node dissection. 

2.  Right-sided oophorectomy.  

3.  Colon resection. 

4.   x2.

5.  Cholecystectomy.  

 

SOCIAL HISTORY:  Denied a history of tobacco, alcohol, or illicit drug use.

 

ALLERGIES:

1.  VICODIN

2.  CODEINE.

3.  MORPHINE. 

 

HOME MEDICATIONS:  

1.  Loratadine. 

2.  Letrozole

3.  Lipitor.

4.  Lisinopril. 

5.  Meloxicam. 

6.  Calcium.

7.  Vitamin D.

8.  Alphagan eyedrops.

 

PHYSICAL EXAMINATION:

VITAL SIGNS:  Stable.

GENERAL:  The patient in some distress due to left hip pain.

HEENT:  No obvious head deformity.  Pupils react to light.  Extraocular muscles intact.

CARDIOVASCULAR:  Regular rate and rhythm with no extra heart sounds.

LUNGS:  Clear.

ABDOMEN:  Soft, nontender, nondistended.  Positive bowel sounds.

EXTREMITIES:  There is tenderness with palpation of the left hip.  Range of motion is severely limit
ed because of pain.

 

LABORATORY DATA:  WBC 11.5.  Otherwise, CBC and CMP are within normal limits.

 

IMAGING:  Hip and pelvic s-ray shows a nondisplaced intertrochanteric fracture involving the proxima
l left femur.  Chest x-ray shows a focus of discoid atelectasis, perhaps to the left costophrenic an
gle.  Degenerative spine and AC joint changes.  Previous right axillary surgery.  She no fracture is
 evident.

 

IMPRESSION:  

1.  Left hip fracture, status post mechanical fall.

2.  History of right breast carcinoma status post partial mastectomy and lymph node dissection.

3.  Hypertension, blood pressure currently within goal.

4.  Gastroesophageal reflux disease.

5.  History of arthritis.

 

PLAN:  We will provide pain medication as needed.  She is currently awaiting an ortho evaluation.  CHRISTIANNE martinez will continue her home medications including letrozole, hormonal therapy for her breast carcinoma.
  For now, activity will be limited to just bed rest, and fall precaution will be implemented.

 

Further workup and management per clinical course.

 

 

Dictated By: NEY CORTEZ/MARQUITA

DD:    05/10/2017 05:39:54

DT:    05/10/2017 07:14:12

Conf#: [id number]

DID#:  [id number]
no

## 2019-03-14 NOTE — ED PROVIDER NOTE - PHYSICAL EXAMINATION
General:     NAD, well-nourished, well-appearing  Eyes: PERRL  Head:     NC/AT, EOMI, dry oral mucosa  Neck:     trachea midline  Lungs:     CTA b/l  CVS:     RRR  Abd:     diffuse abd TTP. LLQ worse. no rebound or guarding  Ext:   no deformities. no rashes   Neuro: AAOx3, no sensory/motor deficits

## 2019-03-14 NOTE — PHYSICAL EXAM
[No Acute Distress] : no acute distress [Well Nourished] : well nourished [Well Developed] : well developed [Well-Appearing] : well-appearing [Normal Voice/Communication] : normal voice/communication [Normal Sclera/Conjunctiva] : normal sclera/conjunctiva [Normal Outer Ear/Nose] : the outer ears and nose were normal in appearance [Normal Oropharynx] : the oropharynx was normal [No Respiratory Distress] : no respiratory distress  [Clear to Auscultation] : lungs were clear to auscultation bilaterally [No Accessory Muscle Use] : no accessory muscle use [Normal Rate] : normal rate  [Regular Rhythm] : with a regular rhythm [Normal S1, S2] : normal S1 and S2

## 2019-03-14 NOTE — ED PROVIDER NOTE - ATTENDING CONTRIBUTION TO CARE
Dr. Grover: I performed a face to face bedside interview with patient regarding history of present illness, review of symptoms and past medical history. I completed an independent physical exam.  I have discussed patient's plan of care with PA.   I agree with note as stated above, having amended the EMR as needed to reflect my findings.   This includes HISTORY OF PRESENT ILLNESS, HIV, PAST MEDICAL/SURGICAL/FAMILY/SOCIAL HISTORY, ALLERGIES AND HOME MEDICATIONS, REVIEW OF SYSTEMS, PHYSICAL EXAM, and any PROGRESS NOTES during the time I functioned as the attending physician for this patient.  Gen:  ill appearing  Head:  NC/AT  Resp: No distress  Abd: ttp llq no rebound, no guarding, stool with visible blood   Ext: no deformities  Skin: warm and dry as visualized

## 2019-03-14 NOTE — ED ADULT NURSE NOTE - PMH
ARF (acute renal failure)    Arthritis    Breast abscess of female    Colles' fracture of right radius    Detached retina, right    HLD (hyperlipidemia)    HTN (hypertension)    Hypercalcemia    Hypothyroidism    SLE (systemic lupus erythematosus)

## 2019-03-14 NOTE — ASSESSMENT
[FreeTextEntry1] : add ground flax 1/2 c daily, diet better x 1 week (increase fiber slowly over 1-2 weeks)\par cannot tolerate statins\par RTO 3 mo for f/u, repeat lipid panel

## 2019-03-14 NOTE — ED ADULT NURSE NOTE - OBJECTIVE STATEMENT
pt complaining of non radiating abdominal pain 6/10 started at 4am today. pt reports nausea, vomiting multiple times and 5-7 episodes of diarrhea after eating a hamburger at a diner at noon yesterday. pt reports bright red blood in stool. pt denies fever/chills.

## 2019-03-14 NOTE — HISTORY OF PRESENT ILLNESS
[FreeTextEntry1] : Presents for f/u visit for hyperlipidemia. Just found out she has b/l glaucoma dx 3 weeks ago, Dr. Lockett in [facility]. Trying drops now, has f/u appointment with him today. Has been under stress lately because  had blindness, but has now recovered, and she  just stopped needing to drive him, he is now able to drive. Had adjusted her diet, but went off her diet with her  being ill. Will re-adjust to healthy, low cholesterol diet again. States cannot tolerate any statin. Has been following with ophtho every 6 months due to taking plaquenil for SLE. \par \par optho: trabatan once daily trial

## 2019-03-14 NOTE — ED PROVIDER NOTE - CLINICAL SUMMARY MEDICAL DECISION MAKING FREE TEXT BOX
abd pain with blood diarrhea. concern for colitis vs diverticulitis abd pain with blood diarrhea. concern for colitis vs diverticulitis ct

## 2019-03-14 NOTE — H&P ADULT - ASSESSMENT
pt 63 y/o female with HTN, HLE, hypothyroid, SLE, presents with abd pain, nausea, vomiting and bloody diarrhea, CT with diverticulitis and colitis.    Admit  NPO except meds for now  IVFluids  IV Cipro, Flagyl  Analgesics- Morphine prn  Cont PPI  Antiemetics prn  Cont other meds as listed  GI Eval- Dr Alejandre  FFup labs  DVT prophylaxis    IMPROVE VTE Individual Risk Assessment  RISK                                                                Points  [  ] Previous VTE                                                  3  [  ] Thrombophilia                                               2  [  ] Lower limb paralysis                                      2        (unable to hold up >15 seconds)    [  ] Current Cancer                                              2         (within 6 months)  [  ] Immobilization > 24 hrs                                1  [  ] ICU/CCU stay > 24 hours                              1  [ x ] Age > 60                                                      1  IMPROVE VTE Score ___1___

## 2019-03-14 NOTE — H&P ADULT - NSICDXPASTMEDICALHX_GEN_ALL_CORE_FT
PAST MEDICAL HISTORY:  ARF (acute renal failure)     Arthritis     Breast abscess of female     Colles' fracture of right radius     Detached retina, right     HLD (hyperlipidemia)     HTN (hypertension)     Hypercalcemia     Hypothyroidism     SLE (systemic lupus erythematosus)

## 2019-03-14 NOTE — H&P ADULT - HISTORY OF PRESENT ILLNESS
pt 63 y/o female with HTN, HLE, hypothyroid, SLE, was in her USOH  until this AM woke up with diffuse lower abd pain, nausea, vomiting and multiple episodes of bloody diarrhea.  Last meal was dinner from last night, was unable to tolerate any po food today.  Denies fever, chills, chest pain.  CT pt 63 y/o female with HTN, HLE, hypothyroid, SLE, was in her USOH  until this AM woke up with diffuse lower abd pain, nausea, vomiting and multiple episodes of bloody diarrhea.  Last meal was dinner from last night, was unable to tolerate any po food today.  Denies fever, chills, chest pain.  CT of abdomen revealed mild diverticulitis of the sigmoid colon. Thickened mildly inflamed transverse colon. Rule out superimposed pseudomembranous colitis. No   diverticular abscess. Hepatomegaly and hepatic steatosis. Moderate sliding hiatus hernia.

## 2019-03-14 NOTE — ED PROVIDER NOTE - OBJECTIVE STATEMENT
pt 63 yo f hx SLE, colitis c/o lower bad pain and bloody diarrhea since 4am. nausea and vomiting pt 65 yo f hx SLE, colitis c/o lower bad pain and multiple episodes of bloody diarrhea since 4am. nausea and vomiting. weakness, unable to tolerate PO  denies fever, chills, rigors

## 2019-03-15 DIAGNOSIS — E66.01 MORBID (SEVERE) OBESITY DUE TO EXCESS CALORIES: ICD-10-CM

## 2019-03-15 DIAGNOSIS — E87.6 HYPOKALEMIA: ICD-10-CM

## 2019-03-15 DIAGNOSIS — E83.42 HYPOMAGNESEMIA: ICD-10-CM

## 2019-03-15 DIAGNOSIS — M32.9 SYSTEMIC LUPUS ERYTHEMATOSUS, UNSPECIFIED: ICD-10-CM

## 2019-03-15 DIAGNOSIS — K52.9 NONINFECTIVE GASTROENTERITIS AND COLITIS, UNSPECIFIED: ICD-10-CM

## 2019-03-15 DIAGNOSIS — A41.9 SEPSIS, UNSPECIFIED ORGANISM: ICD-10-CM

## 2019-03-15 LAB
ANION GAP SERPL CALC-SCNC: 14 MMOL/L — SIGNIFICANT CHANGE UP (ref 5–17)
BASOPHILS # BLD AUTO: 0.1 K/UL — SIGNIFICANT CHANGE UP (ref 0–0.2)
BASOPHILS NFR BLD AUTO: 1 % — SIGNIFICANT CHANGE UP (ref 0–2)
BUN SERPL-MCNC: 13 MG/DL — SIGNIFICANT CHANGE UP (ref 7–23)
CALCIUM SERPL-MCNC: 8.5 MG/DL — SIGNIFICANT CHANGE UP (ref 8.4–10.5)
CHLORIDE SERPL-SCNC: 103 MMOL/L — SIGNIFICANT CHANGE UP (ref 96–108)
CO2 SERPL-SCNC: 21 MMOL/L — LOW (ref 22–31)
CREAT SERPL-MCNC: 0.79 MG/DL — SIGNIFICANT CHANGE UP (ref 0.5–1.3)
EOSINOPHIL # BLD AUTO: 0.1 K/UL — SIGNIFICANT CHANGE UP (ref 0–0.5)
EOSINOPHIL NFR BLD AUTO: 0.9 % — SIGNIFICANT CHANGE UP (ref 0–6)
GLUCOSE SERPL-MCNC: 108 MG/DL — HIGH (ref 70–99)
HCT VFR BLD CALC: 37.3 % — SIGNIFICANT CHANGE UP (ref 34.5–45)
HCV AB S/CO SERPL IA: 0.21 S/CO — SIGNIFICANT CHANGE UP (ref 0–0.79)
HCV AB SERPL-IMP: SIGNIFICANT CHANGE UP
HGB BLD-MCNC: 12.4 G/DL — SIGNIFICANT CHANGE UP (ref 11.5–15.5)
LYMPHOCYTES # BLD AUTO: 1.7 K/UL — SIGNIFICANT CHANGE UP (ref 1–3.3)
LYMPHOCYTES # BLD AUTO: 15.1 % — SIGNIFICANT CHANGE UP (ref 13–44)
MAGNESIUM SERPL-MCNC: 1.2 MG/DL — LOW (ref 1.6–2.6)
MCHC RBC-ENTMCNC: 33.2 PG — SIGNIFICANT CHANGE UP (ref 27–34)
MCHC RBC-ENTMCNC: 33.4 GM/DL — SIGNIFICANT CHANGE UP (ref 32–36)
MCV RBC AUTO: 99.5 FL — SIGNIFICANT CHANGE UP (ref 80–100)
MONOCYTES # BLD AUTO: 0.7 K/UL — SIGNIFICANT CHANGE UP (ref 0–0.9)
MONOCYTES NFR BLD AUTO: 6.1 % — SIGNIFICANT CHANGE UP (ref 2–14)
NEUTROPHILS # BLD AUTO: 8.5 K/UL — HIGH (ref 1.8–7.4)
NEUTROPHILS NFR BLD AUTO: 76.9 % — SIGNIFICANT CHANGE UP (ref 43–77)
PLATELET # BLD AUTO: 101 K/UL — LOW (ref 150–400)
POTASSIUM SERPL-MCNC: 3.1 MMOL/L — LOW (ref 3.5–5.3)
POTASSIUM SERPL-SCNC: 3.1 MMOL/L — LOW (ref 3.5–5.3)
RBC # BLD: 3.75 M/UL — LOW (ref 3.8–5.2)
RBC # FLD: 11.9 % — SIGNIFICANT CHANGE UP (ref 10.3–14.5)
SODIUM SERPL-SCNC: 138 MMOL/L — SIGNIFICANT CHANGE UP (ref 135–145)
TSH SERPL-MCNC: 3.72 UIU/ML — SIGNIFICANT CHANGE UP (ref 0.27–4.2)
WBC # BLD: 11 K/UL — HIGH (ref 3.8–10.5)
WBC # FLD AUTO: 11 K/UL — HIGH (ref 3.8–10.5)

## 2019-03-15 RX ORDER — MAGNESIUM SULFATE 500 MG/ML
1 VIAL (ML) INJECTION
Qty: 0 | Refills: 0 | Status: COMPLETED | OUTPATIENT
Start: 2019-03-15 | End: 2019-03-15

## 2019-03-15 RX ORDER — POTASSIUM CHLORIDE 20 MEQ
40 PACKET (EA) ORAL EVERY 4 HOURS
Qty: 0 | Refills: 0 | Status: COMPLETED | OUTPATIENT
Start: 2019-03-15 | End: 2019-03-15

## 2019-03-15 RX ADMIN — Medication 100 MILLIGRAM(S): at 13:20

## 2019-03-15 RX ADMIN — Medication 100 GRAM(S): at 13:20

## 2019-03-15 RX ADMIN — Medication 650 MILLIGRAM(S): at 23:53

## 2019-03-15 RX ADMIN — Medication 40 MILLIEQUIVALENT(S): at 11:17

## 2019-03-15 RX ADMIN — ONDANSETRON 4 MILLIGRAM(S): 8 TABLET, FILM COATED ORAL at 17:16

## 2019-03-15 RX ADMIN — SODIUM CHLORIDE 100 MILLILITER(S): 9 INJECTION, SOLUTION INTRAVENOUS at 09:14

## 2019-03-15 RX ADMIN — MORPHINE SULFATE 4 MILLIGRAM(S): 50 CAPSULE, EXTENDED RELEASE ORAL at 09:19

## 2019-03-15 RX ADMIN — SODIUM CHLORIDE 100 MILLILITER(S): 9 INJECTION, SOLUTION INTRAVENOUS at 00:19

## 2019-03-15 RX ADMIN — Medication 650 MILLIGRAM(S): at 16:30

## 2019-03-15 RX ADMIN — Medication 100 MILLIGRAM(S): at 21:28

## 2019-03-15 RX ADMIN — LISINOPRIL 40 MILLIGRAM(S): 2.5 TABLET ORAL at 05:34

## 2019-03-15 RX ADMIN — MORPHINE SULFATE 4 MILLIGRAM(S): 50 CAPSULE, EXTENDED RELEASE ORAL at 02:30

## 2019-03-15 RX ADMIN — LATANOPROST 1 DROP(S): 0.05 SOLUTION/ DROPS OPHTHALMIC; TOPICAL at 21:27

## 2019-03-15 RX ADMIN — MORPHINE SULFATE 4 MILLIGRAM(S): 50 CAPSULE, EXTENDED RELEASE ORAL at 06:54

## 2019-03-15 RX ADMIN — Medication 40 MILLIEQUIVALENT(S): at 13:21

## 2019-03-15 RX ADMIN — PANTOPRAZOLE SODIUM 40 MILLIGRAM(S): 20 TABLET, DELAYED RELEASE ORAL at 05:37

## 2019-03-15 RX ADMIN — Medication 125 MICROGRAM(S): at 05:34

## 2019-03-15 RX ADMIN — Medication 100 GRAM(S): at 11:18

## 2019-03-15 RX ADMIN — PANTOPRAZOLE SODIUM 40 MILLIGRAM(S): 20 TABLET, DELAYED RELEASE ORAL at 00:17

## 2019-03-15 RX ADMIN — ONDANSETRON 4 MILLIGRAM(S): 8 TABLET, FILM COATED ORAL at 09:29

## 2019-03-15 RX ADMIN — Medication 20 MILLIGRAM(S): at 05:35

## 2019-03-15 RX ADMIN — Medication 200 MILLIGRAM(S): at 05:33

## 2019-03-15 RX ADMIN — Medication 81 MILLIGRAM(S): at 11:17

## 2019-03-15 RX ADMIN — Medication 100 MILLIGRAM(S): at 05:30

## 2019-03-15 RX ADMIN — Medication 100 GRAM(S): at 11:59

## 2019-03-15 RX ADMIN — MORPHINE SULFATE 4 MILLIGRAM(S): 50 CAPSULE, EXTENDED RELEASE ORAL at 10:19

## 2019-03-15 RX ADMIN — Medication 200 MILLIGRAM(S): at 17:17

## 2019-03-15 RX ADMIN — Medication 400 MILLIGRAM(S): at 11:17

## 2019-03-15 RX ADMIN — Medication 650 MILLIGRAM(S): at 15:47

## 2019-03-15 RX ADMIN — ENOXAPARIN SODIUM 40 MILLIGRAM(S): 100 INJECTION SUBCUTANEOUS at 23:46

## 2019-03-15 RX ADMIN — SODIUM CHLORIDE 100 MILLILITER(S): 9 INJECTION, SOLUTION INTRAVENOUS at 17:17

## 2019-03-15 RX ADMIN — Medication 650 MILLIGRAM(S): at 23:55

## 2019-03-15 RX ADMIN — ENOXAPARIN SODIUM 40 MILLIGRAM(S): 100 INJECTION SUBCUTANEOUS at 00:16

## 2019-03-15 NOTE — PROGRESS NOTE ADULT - ASSESSMENT
64W PMH HTN, HLE, hypothyroid, SLE, presents with abd pain, nausea, vomiting and bloody diarrhea, CT with diverticulitis and colitis.

## 2019-03-15 NOTE — PROGRESS NOTE ADULT - SUBJECTIVE AND OBJECTIVE BOX
Patient is a 64y old  Female who presents with a chief complaint of abdominal pain, vomiting, diarrhea (14 Mar 2019 20:21)    Patient feels better.  Less abdominal pain.      Patient seen and examined at bedside.    ALLERGIES:  codeine (Hives)  Keflex (Unknown)    MEDICATIONS  (STANDING):  aspirin  chewable 81 milliGRAM(s) Oral daily  ciprofloxacin   IVPB 400 milliGRAM(s) IV Intermittent every 12 hours  enoxaparin Injectable 40 milliGRAM(s) SubCutaneous every 24 hours  furosemide    Tablet 20 milliGRAM(s) Oral daily  hydroxychloroquine 400 milliGRAM(s) Oral daily  lactated ringers. 1000 milliLiter(s) (100 mL/Hr) IV Continuous <Continuous>  latanoprost 0.005% Ophthalmic Solution 1 Drop(s) Both EYES at bedtime  levothyroxine 125 MICROGram(s) Oral daily  lisinopril 40 milliGRAM(s) Oral daily  magnesium oxide 400 milliGRAM(s) Oral daily  metroNIDAZOLE  IVPB 500 milliGRAM(s) IV Intermittent every 8 hours  pantoprazole    Tablet 40 milliGRAM(s) Oral before breakfast    MEDICATIONS  (PRN):  acetaminophen   Tablet .. 650 milliGRAM(s) Oral every 6 hours PRN Temp greater or equal to 38C (100.4F), Mild Pain (1 - 3)  morphine  - Injectable 4 milliGRAM(s) IV Push every 3 hours PRN Severe Pain (7 - 10)  ondansetron Injectable 4 milliGRAM(s) IV Push every 6 hours PRN Nausea and/or Vomiting    Vital Signs Last 24 Hrs  T(F): 98.4 (15 Mar 2019 05:37), Max: 98.4 (15 Mar 2019 05:37)  HR: 104 (15 Mar 2019 05:37) (86 - 104)  BP: 159/90 (15 Mar 2019 05:37) (143/99 - 182/90)  RR: 18 (15 Mar 2019 05:37) (17 - 20)  SpO2: 96% (15 Mar 2019 05:37) (95% - 100%)  I&O's Summary    14 Mar 2019 07:01  -  15 Mar 2019 07:00  --------------------------------------------------------  IN: 0 mL / OUT: 2 mL / NET: -2 mL      BMI (kg/m2): 41.6 (03-15-19 @ 05:37)  PHYSICAL EXAM:  General: NAD, A/O x 3  ENT: MMM  Neck: Supple, No JVD  Lungs: Clear to auscultation bilaterally  Cardio: RRR, S1/S2, No murmurs  Abdomen: mild left sided tenderness to palpation   Extremities: No calf tenderness, No pitting edema    LABS:                        12.4   11.0  )-----------( 101      ( 15 Mar 2019 06:45 )             37.3       03-15    138  |  103  |  13  ----------------------------<  108  3.1   |  21  |  0.79    Ca    8.5      15 Mar 2019 06:45  Mg     1.2     03-15    TPro  8.5  /  Alb  3.8  /  TBili  0.4  /  DBili  x   /  AST  24  /  ALT  37  /  AlkPhos  66  03-14     eGFR if Non African American: 78 mL/min/1.73M2 (03-15-19 @ 06:45)  eGFR if : 91 mL/min/1.73M2 (03-15-19 @ 06:45)    CAPILLARY BLOOD GLUCOSE    RADIOLOGY & ADDITIONAL TESTS:    Care Discussed with Consultants/Other Providers: Patient is a 64y old  Female who presents with a chief complaint of abdominal pain, vomiting, diarrhea (14 Mar 2019 20:21)    Patient feels better.  Less abdominal pain.      Patient seen and examined at bedside.    ALLERGIES:  codeine (Hives)  Keflex (Unknown)    MEDICATIONS  (STANDING):  aspirin  chewable 81 milliGRAM(s) Oral daily  ciprofloxacin   IVPB 400 milliGRAM(s) IV Intermittent every 12 hours  enoxaparin Injectable 40 milliGRAM(s) SubCutaneous every 24 hours  furosemide    Tablet 20 milliGRAM(s) Oral daily  hydroxychloroquine 400 milliGRAM(s) Oral daily  lactated ringers. 1000 milliLiter(s) (100 mL/Hr) IV Continuous <Continuous>  latanoprost 0.005% Ophthalmic Solution 1 Drop(s) Both EYES at bedtime  levothyroxine 125 MICROGram(s) Oral daily  lisinopril 40 milliGRAM(s) Oral daily  magnesium oxide 400 milliGRAM(s) Oral daily  metroNIDAZOLE  IVPB 500 milliGRAM(s) IV Intermittent every 8 hours  pantoprazole    Tablet 40 milliGRAM(s) Oral before breakfast    MEDICATIONS  (PRN):  acetaminophen   Tablet .. 650 milliGRAM(s) Oral every 6 hours PRN Temp greater or equal to 38C (100.4F), Mild Pain (1 - 3)  morphine  - Injectable 4 milliGRAM(s) IV Push every 3 hours PRN Severe Pain (7 - 10)  ondansetron Injectable 4 milliGRAM(s) IV Push every 6 hours PRN Nausea and/or Vomiting    Vital Signs Last 24 Hrs  T(F): 98.4 (15 Mar 2019 05:37), Max: 98.4 (15 Mar 2019 05:37)  HR: 104 (15 Mar 2019 05:37) (86 - 104)  BP: 159/90 (15 Mar 2019 05:37) (143/99 - 182/90)  RR: 18 (15 Mar 2019 05:37) (17 - 20)  SpO2: 96% (15 Mar 2019 05:37) (95% - 100%)  I&O's Summary    14 Mar 2019 07:01  -  15 Mar 2019 07:00  --------------------------------------------------------  IN: 0 mL / OUT: 2 mL / NET: -2 mL    BMI (kg/m2): 41.6 (03-15-19 @ 05:37)  PHYSICAL EXAM:  General: NAD, A/O x 3  ENT: MMM  Neck: Supple, No JVD  Lungs: Clear to auscultation bilaterally  Cardio: RRR, S1/S2, No murmurs  Abdomen: mild mid-tenderness to palpation    Extremities: No calf tenderness, No pitting edema    LABS:                        12.4   11.0  )-----------( 101      ( 15 Mar 2019 06:45 )             37.3       03-15    138  |  103  |  13  ----------------------------<  108  3.1   |  21  |  0.79    Ca    8.5      15 Mar 2019 06:45  Mg     1.2     03-15    TPro  8.5  /  Alb  3.8  /  TBili  0.4  /  DBili  x   /  AST  24  /  ALT  37  /  AlkPhos  66  03-14     eGFR if Non African American: 78 mL/min/1.73M2 (03-15-19 @ 06:45)  eGFR if : 91 mL/min/1.73M2 (03-15-19 @ 06:45)    CAPILLARY BLOOD GLUCOSE    RADIOLOGY & ADDITIONAL TESTS:    Care Discussed with Consultants/Other Providers:

## 2019-03-15 NOTE — CONSULT NOTE ADULT - SUBJECTIVE AND OBJECTIVE BOX
Patient seen and examined.  Full consult dictated and will be available shortly.    Elderly female with history of infectious colitis 6/2018 now admitted with 2 day history of lower abdominal pain, rectal bleeding, diarrhea, nausea/vomiting.  VSS Abdomen obese, soft +tenderness LLQ, no rebound/guarding.  CT Abdomen +sigmoid diverticulitis, transverse colitis.    Impression: Diverticulitis    Plan:  1. Continue Cipro + Flagyl for now  2. Clear liquid diet  3. Colonoscopy 8 weeks

## 2019-03-15 NOTE — PROGRESS NOTE ADULT - NSICDXPROBLEM_GEN_ALL_CORE_FT
PROBLEM DIAGNOSES  Problem: Colitis  Assessment and Plan: cont antibiotics, improving, less abdominal pain     Problem: Sepsis  Assessment and Plan: resolving sepsis    Problem: Systemic lupus erythematosus  Assessment and Plan:     Problem: Hypokalemia  Assessment and Plan: replete    Problem: Hypomagnesemia  Assessment and Plan: replete PROBLEM DIAGNOSES  Problem: Colitis  Assessment and Plan: cont antibiotics, improving, less abdominal pain     Problem: Sepsis  Assessment and Plan: resolving sepsis    Problem: Systemic lupus erythematosus  Assessment and Plan:     Problem: Hypokalemia  Assessment and Plan: replete    Problem: Hypomagnesemia  Assessment and Plan: replete    Problem: Morbid obesity  Assessment and Plan:

## 2019-03-16 LAB
ANION GAP SERPL CALC-SCNC: 10 MMOL/L — SIGNIFICANT CHANGE UP (ref 5–17)
BUN SERPL-MCNC: 2 MG/DL — LOW (ref 7–23)
CALCIUM SERPL-MCNC: 8.6 MG/DL — SIGNIFICANT CHANGE UP (ref 8.4–10.5)
CHLORIDE SERPL-SCNC: 104 MMOL/L — SIGNIFICANT CHANGE UP (ref 96–108)
CO2 SERPL-SCNC: 27 MMOL/L — SIGNIFICANT CHANGE UP (ref 22–31)
CREAT SERPL-MCNC: 0.76 MG/DL — SIGNIFICANT CHANGE UP (ref 0.5–1.3)
GLUCOSE SERPL-MCNC: 127 MG/DL — HIGH (ref 70–99)
HCT VFR BLD CALC: 36.1 % — SIGNIFICANT CHANGE UP (ref 34.5–45)
HGB BLD-MCNC: 11.9 G/DL — SIGNIFICANT CHANGE UP (ref 11.5–15.5)
MCHC RBC-ENTMCNC: 32.7 PG — SIGNIFICANT CHANGE UP (ref 27–34)
MCHC RBC-ENTMCNC: 32.9 GM/DL — SIGNIFICANT CHANGE UP (ref 32–36)
MCV RBC AUTO: 99.4 FL — SIGNIFICANT CHANGE UP (ref 80–100)
PLATELET # BLD AUTO: 166 K/UL — SIGNIFICANT CHANGE UP (ref 150–400)
POTASSIUM SERPL-MCNC: 3.4 MMOL/L — LOW (ref 3.5–5.3)
POTASSIUM SERPL-SCNC: 3.4 MMOL/L — LOW (ref 3.5–5.3)
RBC # BLD: 3.64 M/UL — LOW (ref 3.8–5.2)
RBC # FLD: 11.7 % — SIGNIFICANT CHANGE UP (ref 10.3–14.5)
SODIUM SERPL-SCNC: 141 MMOL/L — SIGNIFICANT CHANGE UP (ref 135–145)
WBC # BLD: 10.5 K/UL — SIGNIFICANT CHANGE UP (ref 3.8–10.5)
WBC # FLD AUTO: 10.5 K/UL — SIGNIFICANT CHANGE UP (ref 3.8–10.5)

## 2019-03-16 PROCEDURE — 99233 SBSQ HOSP IP/OBS HIGH 50: CPT

## 2019-03-16 RX ORDER — POTASSIUM CHLORIDE 20 MEQ
40 PACKET (EA) ORAL EVERY 4 HOURS
Qty: 0 | Refills: 0 | Status: COMPLETED | OUTPATIENT
Start: 2019-03-16 | End: 2019-03-16

## 2019-03-16 RX ADMIN — SODIUM CHLORIDE 100 MILLILITER(S): 9 INJECTION, SOLUTION INTRAVENOUS at 02:17

## 2019-03-16 RX ADMIN — LISINOPRIL 40 MILLIGRAM(S): 2.5 TABLET ORAL at 06:20

## 2019-03-16 RX ADMIN — Medication 20 MILLIGRAM(S): at 06:05

## 2019-03-16 RX ADMIN — LATANOPROST 1 DROP(S): 0.05 SOLUTION/ DROPS OPHTHALMIC; TOPICAL at 22:50

## 2019-03-16 RX ADMIN — Medication 400 MILLIGRAM(S): at 12:35

## 2019-03-16 RX ADMIN — Medication 40 MILLIEQUIVALENT(S): at 22:21

## 2019-03-16 RX ADMIN — Medication 100 MILLIGRAM(S): at 22:50

## 2019-03-16 RX ADMIN — MORPHINE SULFATE 4 MILLIGRAM(S): 50 CAPSULE, EXTENDED RELEASE ORAL at 01:00

## 2019-03-16 RX ADMIN — PANTOPRAZOLE SODIUM 40 MILLIGRAM(S): 20 TABLET, DELAYED RELEASE ORAL at 06:21

## 2019-03-16 RX ADMIN — Medication 100 MILLIGRAM(S): at 06:06

## 2019-03-16 RX ADMIN — MORPHINE SULFATE 4 MILLIGRAM(S): 50 CAPSULE, EXTENDED RELEASE ORAL at 17:43

## 2019-03-16 RX ADMIN — MAGNESIUM OXIDE 400 MG ORAL TABLET 400 MILLIGRAM(S): 241.3 TABLET ORAL at 12:36

## 2019-03-16 RX ADMIN — Medication 125 MICROGRAM(S): at 06:05

## 2019-03-16 RX ADMIN — MORPHINE SULFATE 4 MILLIGRAM(S): 50 CAPSULE, EXTENDED RELEASE ORAL at 19:12

## 2019-03-16 RX ADMIN — MORPHINE SULFATE 4 MILLIGRAM(S): 50 CAPSULE, EXTENDED RELEASE ORAL at 00:31

## 2019-03-16 RX ADMIN — Medication 200 MILLIGRAM(S): at 17:23

## 2019-03-16 RX ADMIN — Medication 100 MILLIGRAM(S): at 16:13

## 2019-03-16 RX ADMIN — ENOXAPARIN SODIUM 40 MILLIGRAM(S): 100 INJECTION SUBCUTANEOUS at 23:03

## 2019-03-16 RX ADMIN — Medication 40 MILLIEQUIVALENT(S): at 22:27

## 2019-03-16 RX ADMIN — Medication 81 MILLIGRAM(S): at 12:34

## 2019-03-16 RX ADMIN — Medication 650 MILLIGRAM(S): at 22:50

## 2019-03-16 RX ADMIN — Medication 200 MILLIGRAM(S): at 06:04

## 2019-03-16 NOTE — PROGRESS NOTE ADULT - SUBJECTIVE AND OBJECTIVE BOX
Patient reports that abdominal pain and rectal bleeding has improved since yesterday, but diarrhea persists.  She looks better than when seen yesterday.  Tolerating liquid diet.    MEDICATIONS  (STANDING):  aspirin  chewable 81 milliGRAM(s) Oral daily  ciprofloxacin   IVPB 400 milliGRAM(s) IV Intermittent every 12 hours  enoxaparin Injectable 40 milliGRAM(s) SubCutaneous every 24 hours  furosemide    Tablet 20 milliGRAM(s) Oral daily  hydroxychloroquine 400 milliGRAM(s) Oral daily  lactated ringers. 1000 milliLiter(s) (100 mL/Hr) IV Continuous <Continuous>  latanoprost 0.005% Ophthalmic Solution 1 Drop(s) Both EYES at bedtime  levothyroxine 125 MICROGram(s) Oral daily  lisinopril 40 milliGRAM(s) Oral daily  magnesium oxide 400 milliGRAM(s) Oral daily  metroNIDAZOLE  IVPB 500 milliGRAM(s) IV Intermittent every 8 hours  pantoprazole    Tablet 40 milliGRAM(s) Oral before breakfast  potassium chloride    Tablet ER 40 milliEquivalent(s) Oral every 4 hours    MEDICATIONS  (PRN):  acetaminophen   Tablet .. 650 milliGRAM(s) Oral every 6 hours PRN Temp greater or equal to 38C (100.4F), Mild Pain (1 - 3)  morphine  - Injectable 4 milliGRAM(s) IV Push every 3 hours PRN Severe Pain (7 - 10)  ondansetron Injectable 4 milliGRAM(s) IV Push every 6 hours PRN Nausea and/or Vomiting      Allergies  codeine (Hives)  Keflex (Unknown)    Vital Signs Last 24 Hrs  T(C): 36.8 (16 Mar 2019 05:30), Max: 36.8 (15 Mar 2019 15:52)  T(F): 98.2 (16 Mar 2019 05:30), Max: 98.2 (15 Mar 2019 15:52)  HR: 87 (16 Mar 2019 05:30) (85 - 87)  BP: 136/77 (16 Mar 2019 05:30) (136/77 - 153/86)  BP(mean): --  RR: 14 (16 Mar 2019 05:30) (14 - 18)  SpO2: 95% (16 Mar 2019 05:30) (95% - 97%)    PHYSICAL EXAM:    Constitutional: NAD, well-developed  Neck: No LAD, supple  Respiratory: clear to auscultation b/l no rales, rhonchi, wheezing  Cardiovascular: S1 and S2, RRR, no murmur  Gastrointestinal: +BS x4, soft, NT/ND, neg HSM,  Extremities: No peripheral edema, neg clubbing, cyanosis  Vascular: 2+ peripheral pulses  Neurological: A/O x 3, no focal deficits      LABS:                        11.9   10.5  )-----------( 166      ( 16 Mar 2019 10:00 )             36.1     03-16    141  |  104  |  2<L>  ----------------------------<  127<H>  3.4<L>   |  27  |  0.76    Ca    8.6      16 Mar 2019 10:00  Mg     1.2     03-15      LIVER FUNCTIONS - ( 14 Mar 2019 14:30 )  Alb: 3.8 g/dL / Pro: 8.5 g/dL / ALK PHOS: 66 U/L / ALT: 37 U/L DA / AST: 24 U/L / GGT: x

## 2019-03-16 NOTE — PROGRESS NOTE ADULT - ASSESSMENT
64W PMH HTN, HLE, hypothyroid, SLE, presents with abd pain, nausea, vomiting and bloody diarrhea, CT with diverticulitis and colitis.    # Colitis  cont antibiotics, improving, less abdominal pain, continues to have diarrhea    # Sepsis- secondary to above  resolved    #Systemic lupus erythematosus - no acute issues    #Hypokalemia- replete    #Hypomagnesemia-replete    #Morbid obesity- BMI 41.6

## 2019-03-16 NOTE — PROGRESS NOTE ADULT - SUBJECTIVE AND OBJECTIVE BOX
Patient is a 64y old  Female who presents with a chief complaint of abdominal pain, vomiting, diarrhea (15 Mar 2019 18:23)      Patient seen and examined at bedside.     ALLERGIES:  codeine (Hives)  Keflex (Unknown)    MEDICATIONS:  acetaminophen   Tablet .. 650 milliGRAM(s) Oral every 6 hours PRN  aspirin  chewable 81 milliGRAM(s) Oral daily  ciprofloxacin   IVPB 400 milliGRAM(s) IV Intermittent every 12 hours  enoxaparin Injectable 40 milliGRAM(s) SubCutaneous every 24 hours  furosemide    Tablet 20 milliGRAM(s) Oral daily  hydroxychloroquine 400 milliGRAM(s) Oral daily  lactated ringers. 1000 milliLiter(s) IV Continuous <Continuous>  latanoprost 0.005% Ophthalmic Solution 1 Drop(s) Both EYES at bedtime  levothyroxine 125 MICROGram(s) Oral daily  lisinopril 40 milliGRAM(s) Oral daily  magnesium oxide 400 milliGRAM(s) Oral daily  metroNIDAZOLE  IVPB 500 milliGRAM(s) IV Intermittent every 8 hours  morphine  - Injectable 4 milliGRAM(s) IV Push every 3 hours PRN  ondansetron Injectable 4 milliGRAM(s) IV Push every 6 hours PRN  pantoprazole    Tablet 40 milliGRAM(s) Oral before breakfast    Vital Signs Last 24 Hrs  T(F): 98.2 (16 Mar 2019 05:30), Max: 98.2 (15 Mar 2019 15:52)  HR: 87 (16 Mar 2019 05:30) (85 - 87)  BP: 136/77 (16 Mar 2019 05:30) (136/77 - 153/86)  RR: 14 (16 Mar 2019 05:30) (14 - 18)  SpO2: 95% (16 Mar 2019 05:30) (95% - 97%)  I&O's Summary    15 Mar 2019 07:01  -  16 Mar 2019 07:00  --------------------------------------------------------  IN: 2870 mL / OUT: 702 mL / NET: 2168 mL        PHYSICAL EXAM:  General: NAD, comfortable   Neck: Supple, No JVD  Lungs: CTA, BLAE, No added sounds.   Cardio: RRR, S1/S2, No murmurs  Abdomen: Soft, NT/ND, BS+   Extremities: No edema  CNS: nonfocal     LABS:                        11.9   10.5  )-----------( 166      ( 16 Mar 2019 10:00 )             36.1     03-16    141  |  104  |  2   ----------------------------<  127  3.4   |  27  |  0.76    Ca    8.6      16 Mar 2019 10:00  Mg     1.2     03-15    TPro  8.5  /  Alb  3.8  /  TBili  0.4  /  DBili  x   /  AST  24  /  ALT  37  /  AlkPhos  66  03-14    eGFR if Non African American: 82 mL/min/1.73M2 (03-16-19 @ 10:00)  eGFR if African American: 95 mL/min/1.73M2 (03-16-19 @ 10:00)      TSH 3.72   TSH with FT4 reflex --  Total T3 --      CAPILLARY BLOOD GLUCOSE      RADIOLOGY & ADDITIONAL TESTS:    Care Discussed with Consultants/Other Providers:

## 2019-03-17 LAB
ANION GAP SERPL CALC-SCNC: 13 MMOL/L — SIGNIFICANT CHANGE UP (ref 5–17)
BUN SERPL-MCNC: 6 MG/DL — LOW (ref 7–23)
CALCIUM SERPL-MCNC: 8.7 MG/DL — SIGNIFICANT CHANGE UP (ref 8.4–10.5)
CHLORIDE SERPL-SCNC: 103 MMOL/L — SIGNIFICANT CHANGE UP (ref 96–108)
CO2 SERPL-SCNC: 23 MMOL/L — SIGNIFICANT CHANGE UP (ref 22–31)
CREAT SERPL-MCNC: 0.72 MG/DL — SIGNIFICANT CHANGE UP (ref 0.5–1.3)
GLUCOSE SERPL-MCNC: 89 MG/DL — SIGNIFICANT CHANGE UP (ref 70–99)
HCT VFR BLD CALC: 36.5 % — SIGNIFICANT CHANGE UP (ref 34.5–45)
HGB BLD-MCNC: 12.5 G/DL — SIGNIFICANT CHANGE UP (ref 11.5–15.5)
MAGNESIUM SERPL-MCNC: 1.5 MG/DL — LOW (ref 1.6–2.6)
MCHC RBC-ENTMCNC: 33.4 PG — SIGNIFICANT CHANGE UP (ref 27–34)
MCHC RBC-ENTMCNC: 34.4 GM/DL — SIGNIFICANT CHANGE UP (ref 32–36)
MCV RBC AUTO: 97.2 FL — SIGNIFICANT CHANGE UP (ref 80–100)
PLATELET # BLD AUTO: 175 K/UL — SIGNIFICANT CHANGE UP (ref 150–400)
POTASSIUM SERPL-MCNC: 3.8 MMOL/L — SIGNIFICANT CHANGE UP (ref 3.5–5.3)
POTASSIUM SERPL-SCNC: 3.8 MMOL/L — SIGNIFICANT CHANGE UP (ref 3.5–5.3)
RBC # BLD: 3.75 M/UL — LOW (ref 3.8–5.2)
RBC # FLD: 11.6 % — SIGNIFICANT CHANGE UP (ref 10.3–14.5)
SODIUM SERPL-SCNC: 139 MMOL/L — SIGNIFICANT CHANGE UP (ref 135–145)
WBC # BLD: 8.7 K/UL — SIGNIFICANT CHANGE UP (ref 3.8–10.5)
WBC # FLD AUTO: 8.7 K/UL — SIGNIFICANT CHANGE UP (ref 3.8–10.5)

## 2019-03-17 PROCEDURE — 99233 SBSQ HOSP IP/OBS HIGH 50: CPT

## 2019-03-17 RX ADMIN — Medication 100 MILLIGRAM(S): at 22:37

## 2019-03-17 RX ADMIN — Medication 81 MILLIGRAM(S): at 11:40

## 2019-03-17 RX ADMIN — LISINOPRIL 40 MILLIGRAM(S): 2.5 TABLET ORAL at 06:45

## 2019-03-17 RX ADMIN — SODIUM CHLORIDE 100 MILLILITER(S): 9 INJECTION, SOLUTION INTRAVENOUS at 22:37

## 2019-03-17 RX ADMIN — Medication 200 MILLIGRAM(S): at 06:44

## 2019-03-17 RX ADMIN — Medication 20 MILLIGRAM(S): at 06:44

## 2019-03-17 RX ADMIN — PANTOPRAZOLE SODIUM 40 MILLIGRAM(S): 20 TABLET, DELAYED RELEASE ORAL at 06:45

## 2019-03-17 RX ADMIN — Medication 200 MILLIGRAM(S): at 18:13

## 2019-03-17 RX ADMIN — Medication 125 MICROGRAM(S): at 06:44

## 2019-03-17 RX ADMIN — Medication 100 MILLIGRAM(S): at 14:44

## 2019-03-17 RX ADMIN — MORPHINE SULFATE 4 MILLIGRAM(S): 50 CAPSULE, EXTENDED RELEASE ORAL at 09:03

## 2019-03-17 RX ADMIN — Medication 650 MILLIGRAM(S): at 00:06

## 2019-03-17 RX ADMIN — Medication 400 MILLIGRAM(S): at 11:40

## 2019-03-17 RX ADMIN — MORPHINE SULFATE 4 MILLIGRAM(S): 50 CAPSULE, EXTENDED RELEASE ORAL at 09:20

## 2019-03-17 RX ADMIN — LATANOPROST 1 DROP(S): 0.05 SOLUTION/ DROPS OPHTHALMIC; TOPICAL at 22:36

## 2019-03-17 RX ADMIN — MAGNESIUM OXIDE 400 MG ORAL TABLET 400 MILLIGRAM(S): 241.3 TABLET ORAL at 11:40

## 2019-03-17 RX ADMIN — Medication 100 MILLIGRAM(S): at 06:44

## 2019-03-17 NOTE — PROGRESS NOTE ADULT - ASSESSMENT
64W PMH HTN, HLE, hypothyroid, SLE, presents with abd pain, nausea, vomiting and bloody diarrhea, CT with diverticulitis and colitis.    # Colitis  cont antibiotics,   less abdominal pain,continues to have diarrhea  Clear liquids, Pain mnmgt     # Sepsis-  secondary to above  resolved    #Systemic lupus erythematosus -   no acute issues    #Hypokalemia-   Resolved     #Hypomagnesemia-  Replete     #Morbid obesity- BMI 41.6

## 2019-03-17 NOTE — PROGRESS NOTE ADULT - SUBJECTIVE AND OBJECTIVE BOX
Patient is a 64y old  Female who presents with a chief complaint of abdominal pain, vomiting, diarrhea (16 Mar 2019 14:57)      Patient seen and examined at bedside.     ALLERGIES:  codeine (Hives)  Keflex (Unknown)    MEDICATIONS:  acetaminophen   Tablet .. 650 milliGRAM(s) Oral every 6 hours PRN  aspirin  chewable 81 milliGRAM(s) Oral daily  ciprofloxacin   IVPB 400 milliGRAM(s) IV Intermittent every 12 hours  enoxaparin Injectable 40 milliGRAM(s) SubCutaneous every 24 hours  furosemide    Tablet 20 milliGRAM(s) Oral daily  hydroxychloroquine 400 milliGRAM(s) Oral daily  lactated ringers. 1000 milliLiter(s) IV Continuous <Continuous>  latanoprost 0.005% Ophthalmic Solution 1 Drop(s) Both EYES at bedtime  levothyroxine 125 MICROGram(s) Oral daily  lisinopril 40 milliGRAM(s) Oral daily  magnesium oxide 400 milliGRAM(s) Oral daily  metroNIDAZOLE  IVPB 500 milliGRAM(s) IV Intermittent every 8 hours  morphine  - Injectable 4 milliGRAM(s) IV Push every 3 hours PRN  ondansetron Injectable 4 milliGRAM(s) IV Push every 6 hours PRN  pantoprazole    Tablet 40 milliGRAM(s) Oral before breakfast    Vital Signs Last 24 Hrs  T(F): 97.7 (17 Mar 2019 05:32), Max: 98.9 (16 Mar 2019 17:07)  HR: 74 (17 Mar 2019 05:32) (74 - 80)  BP: 150/76 (17 Mar 2019 05:32) (144/83 - 178/98)  RR: 14 (17 Mar 2019 05:32) (14 - 14)  SpO2: 94% (17 Mar 2019 05:32) (94% - 97%)  I&O's Summary    16 Mar 2019 07:01  -  17 Mar 2019 07:00  --------------------------------------------------------  IN: 720 mL / OUT: 402 mL / NET: 318 mL        PHYSICAL EXAM:  General: NAD, comfortable   ENT: MMM  Neck: Supple, No JVD  Lungs: CTA, BLAE, No added sounds.   Cardio: RRR, S1/S2, No murmurs  Abdomen: Soft, NT/ND, BS+   Extremities: No edema  CNS: nonfocal     LABS:                        12.5   8.7   )-----------( 175      ( 17 Mar 2019 06:20 )             36.5     03-17    139  |  103  |  6   ----------------------------<  89  3.8   |  23  |  0.72    Ca    8.7      17 Mar 2019 06:20  Mg     1.5     03-17    TPro  8.5  /  Alb  3.8  /  TBili  0.4  /  DBili  x   /  AST  24  /  ALT  37  /  AlkPhos  66  03-14    eGFR if Non African American: 88 mL/min/1.73M2 (03-17-19 @ 06:20)  eGFR if African American: 102 mL/min/1.73M2 (03-17-19 @ 06:20)              TSH 3.72   TSH with FT4 reflex --  Total T3 --        CAPILLARY BLOOD GLUCOSE                RADIOLOGY & ADDITIONAL TESTS:    Care Discussed with Consultants/Other Providers:

## 2019-03-17 NOTE — DIETITIAN INITIAL EVALUATION ADULT. - OTHER INFO
65 y/o F admitted w/ diverticulitis, infectious colitis, sepsis, hypomagnesemia, hypokalemia (improved); PMH includes HLD, HTN, SLE, obesity (BMI 39.1). Pt tolerating clear liquids; noted w/ nausea/abdominal pain during time of interview. PTA pt reports following a generally healthy diet. Pt originally seen d/t BMI >40; however pt was re-weighed (BMI is 39.1). Pt also on furosemide currently (fluid shifts expected). Reports UBW of 220#. Provided nutrition education on clear liquid diet; diet to be advanced per MD. Discussed introducing foods w/ diverticulitis. Discussed generally healthy eating patterns. No chewing/swallowing difficulties per pt. Last BM 3/16/19. Skin intact of PU per chart review. No edema noted.

## 2019-03-17 NOTE — DIETITIAN INITIAL EVALUATION ADULT. - PERTINENT LABORATORY DATA
(3/17/19) Hgb 12.5 wnl, Hct 36.5 wnl, Na 139 wnl, K 3.8 wnl, Cl 103 wnl, CO2 23 wnl, BUN 6 wnl, Creat 0.72 wnl, Glu 89 wnl, Ca 8.7 wnl, Mg 1.5 L

## 2019-03-17 NOTE — DIETITIAN INITIAL EVALUATION ADULT. - PERTINENT MEDS FT
enoxaparin, plaquenil, cipro IVPB, flagyl IVPB, lactated ringers, furosemide, levothyroxine, lisinopril, mag-ox 400, morphine injectable, zofran injectable, pantoprazole

## 2019-03-18 LAB
ANION GAP SERPL CALC-SCNC: 10 MMOL/L — SIGNIFICANT CHANGE UP (ref 5–17)
BUN SERPL-MCNC: 6 MG/DL — LOW (ref 7–23)
CALCIUM SERPL-MCNC: 8.9 MG/DL — SIGNIFICANT CHANGE UP (ref 8.4–10.5)
CHLORIDE SERPL-SCNC: 104 MMOL/L — SIGNIFICANT CHANGE UP (ref 96–108)
CO2 SERPL-SCNC: 28 MMOL/L — SIGNIFICANT CHANGE UP (ref 22–31)
CREAT SERPL-MCNC: 0.69 MG/DL — SIGNIFICANT CHANGE UP (ref 0.5–1.3)
GLUCOSE SERPL-MCNC: 107 MG/DL — HIGH (ref 70–99)
HCT VFR BLD CALC: 35.8 % — SIGNIFICANT CHANGE UP (ref 34.5–45)
HGB BLD-MCNC: 11.8 G/DL — SIGNIFICANT CHANGE UP (ref 11.5–15.5)
MAGNESIUM SERPL-MCNC: 1.4 MG/DL — LOW (ref 1.6–2.6)
MCHC RBC-ENTMCNC: 32.1 PG — SIGNIFICANT CHANGE UP (ref 27–34)
MCHC RBC-ENTMCNC: 33 GM/DL — SIGNIFICANT CHANGE UP (ref 32–36)
MCV RBC AUTO: 97.4 FL — SIGNIFICANT CHANGE UP (ref 80–100)
PLATELET # BLD AUTO: 196 K/UL — SIGNIFICANT CHANGE UP (ref 150–400)
POTASSIUM SERPL-MCNC: 3.4 MMOL/L — LOW (ref 3.5–5.3)
POTASSIUM SERPL-SCNC: 3.4 MMOL/L — LOW (ref 3.5–5.3)
RBC # BLD: 3.67 M/UL — LOW (ref 3.8–5.2)
RBC # FLD: 11.2 % — SIGNIFICANT CHANGE UP (ref 10.3–14.5)
SODIUM SERPL-SCNC: 142 MMOL/L — SIGNIFICANT CHANGE UP (ref 135–145)
WBC # BLD: 6.9 K/UL — SIGNIFICANT CHANGE UP (ref 3.8–10.5)
WBC # FLD AUTO: 6.9 K/UL — SIGNIFICANT CHANGE UP (ref 3.8–10.5)

## 2019-03-18 PROCEDURE — 99233 SBSQ HOSP IP/OBS HIGH 50: CPT

## 2019-03-18 RX ORDER — POTASSIUM CHLORIDE 20 MEQ
40 PACKET (EA) ORAL ONCE
Qty: 0 | Refills: 0 | Status: COMPLETED | OUTPATIENT
Start: 2019-03-18 | End: 2019-03-18

## 2019-03-18 RX ORDER — MAGNESIUM SULFATE 500 MG/ML
1 VIAL (ML) INJECTION
Qty: 0 | Refills: 0 | Status: COMPLETED | OUTPATIENT
Start: 2019-03-18 | End: 2019-03-18

## 2019-03-18 RX ADMIN — Medication 100 GRAM(S): at 15:34

## 2019-03-18 RX ADMIN — PANTOPRAZOLE SODIUM 40 MILLIGRAM(S): 20 TABLET, DELAYED RELEASE ORAL at 06:39

## 2019-03-18 RX ADMIN — Medication 81 MILLIGRAM(S): at 11:50

## 2019-03-18 RX ADMIN — Medication 100 GRAM(S): at 18:08

## 2019-03-18 RX ADMIN — Medication 100 MILLIGRAM(S): at 06:39

## 2019-03-18 RX ADMIN — LISINOPRIL 40 MILLIGRAM(S): 2.5 TABLET ORAL at 06:39

## 2019-03-18 RX ADMIN — LATANOPROST 1 DROP(S): 0.05 SOLUTION/ DROPS OPHTHALMIC; TOPICAL at 23:00

## 2019-03-18 RX ADMIN — Medication 650 MILLIGRAM(S): at 07:45

## 2019-03-18 RX ADMIN — Medication 200 MILLIGRAM(S): at 06:37

## 2019-03-18 RX ADMIN — Medication 40 MILLIEQUIVALENT(S): at 14:09

## 2019-03-18 RX ADMIN — ENOXAPARIN SODIUM 40 MILLIGRAM(S): 100 INJECTION SUBCUTANEOUS at 00:22

## 2019-03-18 RX ADMIN — Medication 650 MILLIGRAM(S): at 06:47

## 2019-03-18 RX ADMIN — Medication 100 MILLIGRAM(S): at 14:09

## 2019-03-18 RX ADMIN — MAGNESIUM OXIDE 400 MG ORAL TABLET 400 MILLIGRAM(S): 241.3 TABLET ORAL at 11:50

## 2019-03-18 RX ADMIN — Medication 400 MILLIGRAM(S): at 11:50

## 2019-03-18 RX ADMIN — SODIUM CHLORIDE 100 MILLILITER(S): 9 INJECTION, SOLUTION INTRAVENOUS at 06:40

## 2019-03-18 RX ADMIN — Medication 125 MICROGRAM(S): at 06:38

## 2019-03-18 RX ADMIN — Medication 100 MILLIGRAM(S): at 23:00

## 2019-03-18 RX ADMIN — Medication 200 MILLIGRAM(S): at 17:04

## 2019-03-18 RX ADMIN — Medication 100 GRAM(S): at 17:05

## 2019-03-18 RX ADMIN — Medication 20 MILLIGRAM(S): at 06:38

## 2019-03-18 NOTE — PROGRESS NOTE ADULT - ASSESSMENT
63 y/o female pmh HTN, HLE, hypothyroid, SLE and is admitted with colitis and diverticulitis. Abdomen soft. VSS

## 2019-03-18 NOTE — PROGRESS NOTE ADULT - ASSESSMENT
64W PMH HTN, HLE, hypothyroid, SLE, presents with abd pain, nausea, vomiting and bloody diarrhea, CT with diverticulitis and colitis. On Cipro and Flagyl.    # Colitis/ DIverticulitis  cont Cipro/flegyl  Diarrhea improving  Fever curve, monitor WBC  Clear liquids--> most likely will advance diet today    #Hypokalemia- due to GI losses  Resolved     #Hypomagnesemia due to GI losses  Mg 1.4 today  Replete     #Morbid obesity- BMI 41.6   weight loss encouraged    #Systemic lupus erythematosus -   no acute issues    Dispo: probable home tomorrow  DVT ppx: lovenox 64W PMH HTN, HLE, hypothyroid, SLE, presents with abd pain, nausea, vomiting and bloody diarrhea, CT with diverticulitis and colitis. On Cipro and Flagyl.    # Colitis/ DIverticulitis  cont Cipro/flagyl  Diarrhea improving  Fever curve, monitor WBC  Clear liquids--> most likely will advance diet today    #Hypokalemia- due to GI losses  Resolved     #Hypomagnesemia due to GI losses  Mg 1.4 today  Replete     #Morbid obesity- BMI 41.6   weight loss encouraged    #Systemic lupus erythematosus -   no acute issues    Dispo: probable home tomorrow  DVT ppx: lovenox

## 2019-03-18 NOTE — PROGRESS NOTE ADULT - SUBJECTIVE AND OBJECTIVE BOX
INTERVAL HPI/OVERNIGHT EVENTS:  HPI:  65 y/o female pmh HTN, HLE, hypothyroid, SLE and is admitted with colitis. Patient states he stool frequency is less, from 23 stools in a 12 hour period to about 8 stools. Denies nausea, vomiting, fever, chills. She is tolerating liquid diet. She states pain comes on right before she needs to have bowel movement. Last colonoscopy was in 2016 without any significant findings as per patient.     MEDICATIONS  (STANDING):  aspirin  chewable 81 milliGRAM(s) Oral daily  ciprofloxacin   IVPB 400 milliGRAM(s) IV Intermittent every 12 hours  enoxaparin Injectable 40 milliGRAM(s) SubCutaneous every 24 hours  furosemide    Tablet 20 milliGRAM(s) Oral daily  hydroxychloroquine 400 milliGRAM(s) Oral daily  latanoprost 0.005% Ophthalmic Solution 1 Drop(s) Both EYES at bedtime  levothyroxine 125 MICROGram(s) Oral daily  lisinopril 40 milliGRAM(s) Oral daily  magnesium oxide 400 milliGRAM(s) Oral daily  magnesium sulfate  IVPB 1 Gram(s) IV Intermittent every 1 hour  metroNIDAZOLE  IVPB 500 milliGRAM(s) IV Intermittent every 8 hours  nisoldipine er 17 milliGRAM(s) 17 milliGRAM(s) Oral two times a day  pantoprazole    Tablet 40 milliGRAM(s) Oral before breakfast    MEDICATIONS  (PRN):  acetaminophen   Tablet .. 650 milliGRAM(s) Oral every 6 hours PRN Temp greater or equal to 38C (100.4F), Mild Pain (1 - 3)  morphine  - Injectable 4 milliGRAM(s) IV Push every 3 hours PRN Severe Pain (7 - 10)  ondansetron Injectable 4 milliGRAM(s) IV Push every 6 hours PRN Nausea and/or Vomiting      Allergies    codeine (Hives)  Keflex (Unknown)    Intolerances        PHYSICAL EXAM:   Vital Signs:  Vital Signs Last 24 Hrs  T(C): 37.6 (18 Mar 2019 05:27), Max: 37.6 (18 Mar 2019 05:27)  T(F): 99.7 (18 Mar 2019 05:27), Max: 99.7 (18 Mar 2019 05:27)  HR: 81 (18 Mar 2019 05:27) (73 - 81)  BP: 167/91 (18 Mar 2019 05:27) (146/81 - 167/91)  BP(mean): --  RR: 15 (18 Mar 2019 05:27) (14 - 15)  SpO2: 95% (18 Mar 2019 05:27) (95% - 96%)  Daily     Daily Weight in k.8 (18 Mar 2019 05:27)I&O's Summary    17 Mar 2019 07:  -  18 Mar 2019 07:00  --------------------------------------------------------  IN: 2500 mL / OUT: 700 mL / NET: 1800 mL    18 Mar 2019 07:01  -  18 Mar 2019 14:52  --------------------------------------------------------  IN: 600 mL / OUT: 0 mL / NET: 600 mL        GENERAL:  Appears stated age, well-groomed, well-nourished, no distress  HEENT:  NC/AT,  conjunctivae clear and pink, no thyromegaly, nodules, adenopathy, no JVD, sclera -anicteric  CHEST:  Full & symmetric excursion, no increased effort, breath sounds clear  HEART:  Regular rhythm, S1, S2, no murmur/rub/S3/S4, no abdominal bruit, no edema  ABDOMEN:  Soft, non-tender, non-distended, normoactive bowel sounds,  no masses ,no hepato-splenomegaly, no signs of chronic liver disease  EXTEREMITIES:  no cyanosis,clubbing or edema  SKIN:  No rash/erythema/ecchymoses/petechiae/wounds/abscess/warm/dry  NEURO:  Alert, oriented, no asterixis, no tremor, no encephalopathy      LABS:                        11.8   6.9   )-----------( 196      ( 18 Mar 2019 07:15 )             35.8     -18    142  |  104  |  6<L>  ----------------------------<  107<H>  3.4<L>   |  28  |  0.69    Ca    8.9      18 Mar 2019 07:15  Mg     1.4     03-18          amylase   lipase  RADIOLOGY & ADDITIONAL TESTS:

## 2019-03-18 NOTE — PROGRESS NOTE ADULT - NSICDXPROBLEM_GEN_ALL_CORE_FT
PROBLEM DIAGNOSES  Problem: Colitis  Assessment and Plan: Continue IV ATB  Advance regular diet, low residue lactose free  Repeat colonoscopy in 8 weeks

## 2019-03-18 NOTE — PROGRESS NOTE ADULT - SUBJECTIVE AND OBJECTIVE BOX
Patient is a 64y old  Female who presents with a chief complaint of abdominal pain, vomiting, diarrhea (17 Mar 2019 11:02).  Reports diarrhea has improved from 24 BM in 12 hrs to 8 BM in 12 hrs.  Feels hungry.  Does admit to mild abdominal cramping, which is relieved with BM.      Patient seen and examined at bedside.    ALLERGIES:  codeine (Hives)  Keflex (Unknown)    MEDICATIONS  (STANDING):  aspirin  chewable 81 milliGRAM(s) Oral daily  ciprofloxacin   IVPB 400 milliGRAM(s) IV Intermittent every 12 hours  enoxaparin Injectable 40 milliGRAM(s) SubCutaneous every 24 hours  furosemide    Tablet 20 milliGRAM(s) Oral daily  hydroxychloroquine 400 milliGRAM(s) Oral daily  latanoprost 0.005% Ophthalmic Solution 1 Drop(s) Both EYES at bedtime  levothyroxine 125 MICROGram(s) Oral daily  lisinopril 40 milliGRAM(s) Oral daily  magnesium oxide 400 milliGRAM(s) Oral daily  magnesium sulfate  IVPB 1 Gram(s) IV Intermittent every 1 hour  metroNIDAZOLE  IVPB 500 milliGRAM(s) IV Intermittent every 8 hours  nisoldipine er 17 milliGRAM(s) 17 milliGRAM(s) Oral two times a day  pantoprazole    Tablet 40 milliGRAM(s) Oral before breakfast  potassium chloride    Tablet ER 40 milliEquivalent(s) Oral once    MEDICATIONS  (PRN):  acetaminophen   Tablet .. 650 milliGRAM(s) Oral every 6 hours PRN Temp greater or equal to 38C (100.4F), Mild Pain (1 - 3)  morphine  - Injectable 4 milliGRAM(s) IV Push every 3 hours PRN Severe Pain (7 - 10)  ondansetron Injectable 4 milliGRAM(s) IV Push every 6 hours PRN Nausea and/or Vomiting    Vital Signs Last 24 Hrs  T(F): 99.7 (18 Mar 2019 05:27), Max: 99.7 (18 Mar 2019 05:27)  HR: 81 (18 Mar 2019 05:27) (73 - 81)  BP: 167/91 (18 Mar 2019 05:27) (146/81 - 167/91)  RR: 15 (18 Mar 2019 05:27) (14 - 15)  SpO2: 95% (18 Mar 2019 05:27) (95% - 96%)  I&O's Summary    17 Mar 2019 07:01  -  18 Mar 2019 07:00  --------------------------------------------------------  IN: 2500 mL / OUT: 700 mL / NET: 1800 mL    18 Mar 2019 07:01  -  18 Mar 2019 12:32  --------------------------------------------------------  IN: 200 mL / OUT: 0 mL / NET: 200 mL      BMI (kg/m2): 41.6 (03-15-19 @ 05:37)  PHYSICAL EXAM:  General: NAD, A/O x 3  ENT: MMM  Neck: Supple, No JVD  Lungs: Clear to auscultation bilaterally  Cardio: RRR, S1/S2,   Abdomen: Soft, Nontender, Nondistended; Bowel sounds present  Extremities: No calf tenderness, No pitting edema    LABS:                        11.8   6.9   )-----------( 196      ( 18 Mar 2019 07:15 )             35.8       03-18    142  |  104  |  6   ----------------------------<  107  3.4   |  28  |  0.69    Ca    8.9      18 Mar 2019 07:15  Mg     1.4     03-18       eGFR if Non African American: 91 mL/min/1.73M2 (03-18-19 @ 07:15)  eGFR if African American: 105 mL/min/1.73M2 (03-18-19 @ 07:15)                     CAPILLARY BLOOD GLUCOSE                RADIOLOGY & ADDITIONAL TESTS:    Care Discussed with Consultants/Other Providers:

## 2019-03-19 ENCOUNTER — TRANSCRIPTION ENCOUNTER (OUTPATIENT)
Age: 64
End: 2019-03-19

## 2019-03-19 VITALS
OXYGEN SATURATION: 96 % | SYSTOLIC BLOOD PRESSURE: 145 MMHG | TEMPERATURE: 98 F | RESPIRATION RATE: 14 BRPM | HEART RATE: 91 BPM | DIASTOLIC BLOOD PRESSURE: 73 MMHG

## 2019-03-19 LAB
ANION GAP SERPL CALC-SCNC: 11 MMOL/L — SIGNIFICANT CHANGE UP (ref 5–17)
BUN SERPL-MCNC: 10 MG/DL — SIGNIFICANT CHANGE UP (ref 7–23)
CALCIUM SERPL-MCNC: 9 MG/DL — SIGNIFICANT CHANGE UP (ref 8.4–10.5)
CHLORIDE SERPL-SCNC: 104 MMOL/L — SIGNIFICANT CHANGE UP (ref 96–108)
CO2 SERPL-SCNC: 26 MMOL/L — SIGNIFICANT CHANGE UP (ref 22–31)
CREAT SERPL-MCNC: 0.75 MG/DL — SIGNIFICANT CHANGE UP (ref 0.5–1.3)
CULTURE RESULTS: SIGNIFICANT CHANGE UP
GLUCOSE SERPL-MCNC: 119 MG/DL — HIGH (ref 70–99)
HCT VFR BLD CALC: 37.5 % — SIGNIFICANT CHANGE UP (ref 34.5–45)
HGB BLD-MCNC: 12.4 G/DL — SIGNIFICANT CHANGE UP (ref 11.5–15.5)
MAGNESIUM SERPL-MCNC: 1.8 MG/DL — SIGNIFICANT CHANGE UP (ref 1.6–2.6)
MCHC RBC-ENTMCNC: 32.4 PG — SIGNIFICANT CHANGE UP (ref 27–34)
MCHC RBC-ENTMCNC: 33.1 GM/DL — SIGNIFICANT CHANGE UP (ref 32–36)
MCV RBC AUTO: 97.7 FL — SIGNIFICANT CHANGE UP (ref 80–100)
PLATELET # BLD AUTO: 209 K/UL — SIGNIFICANT CHANGE UP (ref 150–400)
POTASSIUM SERPL-MCNC: 3.5 MMOL/L — SIGNIFICANT CHANGE UP (ref 3.5–5.3)
POTASSIUM SERPL-SCNC: 3.5 MMOL/L — SIGNIFICANT CHANGE UP (ref 3.5–5.3)
RBC # BLD: 3.84 M/UL — SIGNIFICANT CHANGE UP (ref 3.8–5.2)
RBC # FLD: 11.4 % — SIGNIFICANT CHANGE UP (ref 10.3–14.5)
SODIUM SERPL-SCNC: 141 MMOL/L — SIGNIFICANT CHANGE UP (ref 135–145)
SPECIMEN SOURCE: SIGNIFICANT CHANGE UP
WBC # BLD: 6.7 K/UL — SIGNIFICANT CHANGE UP (ref 3.8–10.5)
WBC # FLD AUTO: 6.7 K/UL — SIGNIFICANT CHANGE UP (ref 3.8–10.5)

## 2019-03-19 PROCEDURE — 36415 COLL VENOUS BLD VENIPUNCTURE: CPT

## 2019-03-19 PROCEDURE — 80048 BASIC METABOLIC PNL TOTAL CA: CPT

## 2019-03-19 PROCEDURE — 83735 ASSAY OF MAGNESIUM: CPT

## 2019-03-19 PROCEDURE — 84443 ASSAY THYROID STIM HORMONE: CPT

## 2019-03-19 PROCEDURE — 80053 COMPREHEN METABOLIC PANEL: CPT

## 2019-03-19 PROCEDURE — 86803 HEPATITIS C AB TEST: CPT

## 2019-03-19 PROCEDURE — 96376 TX/PRO/DX INJ SAME DRUG ADON: CPT

## 2019-03-19 PROCEDURE — 99239 HOSP IP/OBS DSCHRG MGMT >30: CPT

## 2019-03-19 PROCEDURE — 87177 OVA AND PARASITES SMEARS: CPT

## 2019-03-19 PROCEDURE — 96361 HYDRATE IV INFUSION ADD-ON: CPT

## 2019-03-19 PROCEDURE — 96375 TX/PRO/DX INJ NEW DRUG ADDON: CPT

## 2019-03-19 PROCEDURE — 99285 EMERGENCY DEPT VISIT HI MDM: CPT | Mod: 25

## 2019-03-19 PROCEDURE — 74177 CT ABD & PELVIS W/CONTRAST: CPT

## 2019-03-19 PROCEDURE — 96374 THER/PROPH/DIAG INJ IV PUSH: CPT | Mod: XU

## 2019-03-19 PROCEDURE — 85027 COMPLETE CBC AUTOMATED: CPT

## 2019-03-19 RX ORDER — MOXIFLOXACIN HYDROCHLORIDE TABLETS, 400 MG 400 MG/1
1 TABLET, FILM COATED ORAL
Qty: 12 | Refills: 0 | OUTPATIENT
Start: 2019-03-19 | End: 2019-03-24

## 2019-03-19 RX ORDER — HYDROXYCHLOROQUINE SULFATE 200 MG
2 TABLET ORAL
Qty: 0 | Refills: 0 | COMMUNITY
Start: 2019-03-19

## 2019-03-19 RX ORDER — METRONIDAZOLE 500 MG
1 TABLET ORAL
Qty: 18 | Refills: 0 | OUTPATIENT
Start: 2019-03-19 | End: 2019-03-24

## 2019-03-19 RX ADMIN — ENOXAPARIN SODIUM 40 MILLIGRAM(S): 100 INJECTION SUBCUTANEOUS at 01:22

## 2019-03-19 RX ADMIN — Medication 100 MILLIGRAM(S): at 06:33

## 2019-03-19 RX ADMIN — Medication 125 MICROGRAM(S): at 06:35

## 2019-03-19 RX ADMIN — LISINOPRIL 40 MILLIGRAM(S): 2.5 TABLET ORAL at 06:35

## 2019-03-19 RX ADMIN — Medication 81 MILLIGRAM(S): at 11:31

## 2019-03-19 RX ADMIN — PANTOPRAZOLE SODIUM 40 MILLIGRAM(S): 20 TABLET, DELAYED RELEASE ORAL at 06:35

## 2019-03-19 RX ADMIN — MAGNESIUM OXIDE 400 MG ORAL TABLET 400 MILLIGRAM(S): 241.3 TABLET ORAL at 11:30

## 2019-03-19 RX ADMIN — Medication 100 MILLIGRAM(S): at 14:37

## 2019-03-19 RX ADMIN — Medication 20 MILLIGRAM(S): at 06:34

## 2019-03-19 RX ADMIN — Medication 400 MILLIGRAM(S): at 11:31

## 2019-03-19 RX ADMIN — Medication 200 MILLIGRAM(S): at 06:33

## 2019-03-19 NOTE — DISCHARGE NOTE NURSING/CASE MANAGEMENT/SOCIAL WORK - NSDCDPATPORTLINK_GEN_ALL_CORE
You can access the [facility] Patient Portal, offered by [facility], by registering with the following website: [email or web address]

## 2019-03-19 NOTE — PROGRESS NOTE ADULT - REASON FOR ADMISSION
abdominal pain, vomiting, diarrhea

## 2019-03-19 NOTE — PROGRESS NOTE ADULT - PROVIDER SPECIALTY LIST ADULT
Gastroenterology
Gastroenterology
Hospitalist
Gastroenterology

## 2019-03-19 NOTE — PROGRESS NOTE ADULT - ATTENDING COMMENTS
Continue IV antibiotics for now  Remain on clear liquid diet today
I have personally seen and examined patient on the above date.  I discussed the case with LINK Bazan and I agree with findings and plan as detailed per note above, which I have amended where appropriate.      dc home today   seen by GI, will follow up as outpatient  tolerating diet well   resolved
I have personally seen and examined patient on the above date.  I discussed the case with PA, Hortensia and I agree with findings and plan as detailed per note above, which I have amended where appropriate.      advance diet  GI follow up  pt is improving  anticipate dc in 24-48 hrs
Patient seen and examined.  Agree with assessment and plan as above.    She has improved.  Will continue on antibiotic course for full 10 days.  Abdomen soft, nontender    She will follow-up in office in 2 weeks.
Patient seen and examined.  Agree with assessment and plan as above.    She is better overall.  Diarrhea is still present, although less.  Tolerating advancement in diet.  Abdomen soft, nontender BS+    Continue antibiotics; advance diet as tolerated.

## 2019-03-19 NOTE — DISCHARGE NOTE NURSING/CASE MANAGEMENT/SOCIAL WORK - NSTRANSFERBELONGINGSDISPO_GEN_A_NUR
given to security with patient/given to security/money with security, patient informed to  belongings at security upon discharge from unit.

## 2019-03-19 NOTE — PROGRESS NOTE ADULT - SUBJECTIVE AND OBJECTIVE BOX
INTERVAL HPI/OVERNIGHT EVENTS:  HPI:  65 y/o female pmh HTN, HLE, hypothyroid, SLE and is admitted with colitis. She states last bowel movement was yesterday after lunch. Denies abdominal pain. Tolerating low residue/lactose free diet.       MEDICATIONS  (STANDING):  aspirin  chewable 81 milliGRAM(s) Oral daily  ciprofloxacin   IVPB 400 milliGRAM(s) IV Intermittent every 12 hours  enoxaparin Injectable 40 milliGRAM(s) SubCutaneous every 24 hours  furosemide    Tablet 20 milliGRAM(s) Oral daily  hydroxychloroquine 400 milliGRAM(s) Oral daily  latanoprost 0.005% Ophthalmic Solution 1 Drop(s) Both EYES at bedtime  levothyroxine 125 MICROGram(s) Oral daily  lisinopril 40 milliGRAM(s) Oral daily  magnesium oxide 400 milliGRAM(s) Oral daily  metroNIDAZOLE  IVPB 500 milliGRAM(s) IV Intermittent every 8 hours  nisoldipine er 17 milliGRAM(s) 17 milliGRAM(s) Oral two times a day  pantoprazole    Tablet 40 milliGRAM(s) Oral before breakfast    MEDICATIONS  (PRN):  acetaminophen   Tablet .. 650 milliGRAM(s) Oral every 6 hours PRN Temp greater or equal to 38C (100.4F), Mild Pain (1 - 3)  morphine  - Injectable 4 milliGRAM(s) IV Push every 3 hours PRN Severe Pain (7 - 10)  ondansetron Injectable 4 milliGRAM(s) IV Push every 6 hours PRN Nausea and/or Vomiting      Allergies    codeine (Hives)  Keflex (Unknown)    Intolerances        PHYSICAL EXAM:   Vital Signs:  Vital Signs Last 24 Hrs  T(C): 37 (19 Mar 2019 05:38), Max: 37 (19 Mar 2019 05:38)  T(F): 98.6 (19 Mar 2019 05:38), Max: 98.6 (19 Mar 2019 05:38)  HR: 72 (19 Mar 2019 05:38) (72 - 92)  BP: 164/82 (19 Mar 2019 05:38) (124/77 - 164/82)  BP(mean): --  RR: 14 (19 Mar 2019 05:38) (14 - 14)  SpO2: 96% (19 Mar 2019 05:38) (96% - 96%)  Daily     Daily Weight in k.7 (19 Mar 2019 05:38)I&O's Summary    18 Mar 2019 07:  -  19 Mar 2019 07:00  --------------------------------------------------------  IN: 1500 mL / OUT: 300 mL / NET: 1200 mL    19 Mar 2019 07:  -  19 Mar 2019 11:32  --------------------------------------------------------  IN: 500 mL / OUT: 0 mL / NET: 500 mL        GENERAL:  Appears stated age, well-groomed, well-nourished, no distress  HEENT:  NC/AT,  conjunctivae clear and pink,   CHEST:  Full & symmetric excursion, no increased effort, breath sounds clear  HEART:  Regular rhythm, S1, S2,  ABDOMEN:  Soft, non-tender, non-distended, + normoactive bowel sounds,    EXTEREMITIES:  no  edema  SKIN:  No rash, warm/dry  NEURO:  Alert, oriented,     LABS:                        12.4   6.7   )-----------( 209      ( 19 Mar 2019 07:23 )             37.5         141  |  104  |  10  ----------------------------<  119<H>  3.5   |  26  |  0.75    Ca    9.0      19 Mar 2019 07:23  Mg     1.8                 RADIOLOGY & ADDITIONAL TESTS:

## 2019-03-19 NOTE — PROGRESS NOTE ADULT - ASSESSMENT
64W PMH HTN, HLE, hypothyroid, SLE, presents with abd pain, nausea, vomiting and bloody diarrhea, CT with diverticulitis and colitis. On Cipro and Flagyl.    # Colitis/ DIverticulitis  cont Cipro/flagyl  Diarrhea improving  Fever curve, monitor WBC  Tolerating diet--low fiber, lactose free  WIll need cscope in 8 weeks    #Hypokalemia- due to GI losses  Resolved     #Hypomagnesemia due to GI losses  resolved      #Morbid obesity- BMI 41.6   weight loss encouraged    #Systemic lupus erythematosus -   no acute issues    Dispo: probable home later today with GI outpatient followup  DVT ppx: lovenox

## 2019-03-19 NOTE — DISCHARGE NOTE PROVIDER - NSDCFUADDAPPT_GEN_ALL_CORE_FT
Patient should follow up with Dr Alejandre in 2 weeks.    Patient will follow up with PMD in a few days. She would like to make appt herself

## 2019-03-19 NOTE — PROGRESS NOTE ADULT - NSICDXPROBLEM_GEN_ALL_CORE_FT
PROBLEM DIAGNOSES  Problem: Colitis  Assessment and Plan: May D/C home on oral antibiotics x 6 more days  Continue low residue lactose free  Repeat colonoscopy in 8 weeks   Outpatient f/u with Dr. Alejandre

## 2019-03-19 NOTE — DISCHARGE NOTE PROVIDER - CARE PROVIDER_API CALL
Garett Santana)  Family Medicine  [address] [place] [address]  Phone: [phone]  Fax: [phone]  Follow Up Time:     Srini Alejandre)  Gastroenterology; Internal Medicine  [address] [place] [address]  Phone: [phone]  Fax: [phone]  Follow Up Time:

## 2019-03-19 NOTE — PROGRESS NOTE ADULT - ASSESSMENT
65 y/o female pmh HTN, HLE, hypothyroid, SLE and is admitted with colitis. Abdomen soft. Non-tender. VSS. Tolerating reg diet.

## 2019-03-19 NOTE — DISCHARGE NOTE PROVIDER - HOSPITAL COURSE
64W PMH HTN, HLE, hypothyroid, SLE, presents with abd pain, nausea, vomiting and bloody diarrhea, CT with diverticulitis and colitis. Treated w IV cipro/flagyl.  GI consulted.  Patient clinically improved during hospital course.          Stable for discharge home on 3/19/19 on low fiber, no lactose diet. She will follow up with Dr Alejandre in 2 weeks and will have outpt cscope in 6 weeks.        She should follow up with PMD in 3-5days.  She should have repeat BMP/Mg in the next few days    PMD Dr Santana alerted of discharge plan 64W PMH HTN, HLE, hypothyroid, SLE, presents with abd pain, nausea, vomiting and bloody diarrhea, CT with diverticulitis and colitis. Treated w IV cipro/flagyl.  GI consulted.  Patient clinically improved during hospital course.          Stable for discharge home on 3/19/19 on low fiber, no lactose diet. She will follow up with Dr Alejandre in 2 weeks and will have outpt cscope in 6 weeks.        She should follow up with PMD in 3-5days.  She should have repeat BMP/Mg in the next few days    PMD Dr Santana alerted of discharge plan        ok to dc home - dr montes

## 2019-03-20 PROBLEM — H40.9 UNSPECIFIED GLAUCOMA: Chronic | Status: ACTIVE | Noted: 2019-03-14

## 2019-03-22 LAB
MAGNESIUM SERPL-MCNC: 2 MG/DL
POTASSIUM SERPL-SCNC: 4.2 MMOL/L

## 2019-03-25 ENCOUNTER — APPOINTMENT (OUTPATIENT)
Dept: FAMILY MEDICINE | Facility: CLINIC | Age: 64
End: 2019-03-25
Payer: MEDICARE

## 2019-03-25 VITALS
DIASTOLIC BLOOD PRESSURE: 70 MMHG | HEART RATE: 84 BPM | BODY MASS INDEX: 40.3 KG/M2 | WEIGHT: 227.44 LBS | RESPIRATION RATE: 14 BRPM | OXYGEN SATURATION: 95 % | HEIGHT: 63 IN | SYSTOLIC BLOOD PRESSURE: 124 MMHG

## 2019-03-25 DIAGNOSIS — K52.9 NONINFECTIVE GASTROENTERITIS AND COLITIS, UNSPECIFIED: ICD-10-CM

## 2019-03-25 DIAGNOSIS — K57.32 DIVERTICULITIS OF LARGE INTESTINE W/OUT PERFORATION OR ABSCESS W/OUT BLEEDING: ICD-10-CM

## 2019-03-25 DIAGNOSIS — R19.7 DIARRHEA, UNSPECIFIED: ICD-10-CM

## 2019-03-25 PROCEDURE — 99214 OFFICE O/P EST MOD 30 MIN: CPT

## 2019-03-27 PROBLEM — R19.7 DIARRHEA: Status: ACTIVE | Noted: 2019-03-27

## 2019-03-27 PROBLEM — K57.32 DIVERTICULITIS OF COLON: Status: ACTIVE | Noted: 2019-03-27

## 2019-03-27 NOTE — HISTORY OF PRESENT ILLNESS
[FreeTextEntry1] : Patient presents for f/u recent hospitalization dx diverticulitis, tx with cipro/flagyl. Same day as her last appointment here in this office (was well for visit), patient went home and at 4 am started vomiting and diarrhea. Last dose cipro or flagyl today, patient unsure which. Had unpredictable diarrhea yesterday where she would have to 'run to bathroom.'  Patient also takes 500 mg magnesium twice daily. Still some residual LLQ soreness, but much improved since beginning. No further fever, vomiting. +joint pains.\par \par ROS: negative except as noted above\par \par \par has appointment with Dr. Alejandre whom she saw in hospital 4/1/19

## 2019-03-27 NOTE — ASSESSMENT
[FreeTextEntry1] : patient advised to stop magnesium supplement, may be contributing or sole cause of diarrhea\par Return to office if you feel worse or do not feel better\par patient has not yet repeated lipid panel as it has not yet been 3 months, await results, f/u 3 months\par hospital labs, imaging d/c summary reviewed with patient during visit

## 2019-03-27 NOTE — PHYSICAL EXAM
[No Acute Distress] : no acute distress [Well Nourished] : well nourished [Well Developed] : well developed [Well-Appearing] : well-appearing [Normal Voice/Communication] : normal voice/communication [Normal Sclera/Conjunctiva] : normal sclera/conjunctiva [Normal Outer Ear/Nose] : the outer ears and nose were normal in appearance [Normal Oropharynx] : the oropharynx was normal [No Respiratory Distress] : no respiratory distress  [Clear to Auscultation] : lungs were clear to auscultation bilaterally [No Accessory Muscle Use] : no accessory muscle use [Normal Rate] : normal rate  [Regular Rhythm] : with a regular rhythm [Normal S1, S2] : normal S1 and S2 [Soft] : abdomen soft [Non-distended] : non-distended [No Masses] : no abdominal mass palpated [No HSM] : no HSM [Speech Grossly Normal] : speech grossly normal [Memory Grossly Normal] : memory grossly normal [Normal Affect] : the affect was normal [Alert and Oriented x3] : oriented to person, place, and time [Normal Mood] : the mood was normal [Normal Insight/Judgement] : insight and judgment were intact [de-identified] : slight LLQ tenderness to palpation, no rebound, no guarding

## 2019-04-29 ENCOUNTER — APPOINTMENT (OUTPATIENT)
Dept: OBGYN | Facility: CLINIC | Age: 64
End: 2019-04-29

## 2019-05-07 ENCOUNTER — RX RENEWAL (OUTPATIENT)
Age: 64
End: 2019-05-07

## 2019-05-08 ENCOUNTER — RX RENEWAL (OUTPATIENT)
Age: 64
End: 2019-05-08

## 2019-05-13 ENCOUNTER — APPOINTMENT (OUTPATIENT)
Dept: OBGYN | Facility: CLINIC | Age: 64
End: 2019-05-13

## 2019-05-14 ENCOUNTER — APPOINTMENT (OUTPATIENT)
Dept: ORTHOPEDIC SURGERY | Facility: CLINIC | Age: 64
End: 2019-05-14
Payer: MEDICARE

## 2019-05-14 VITALS — SYSTOLIC BLOOD PRESSURE: 144 MMHG | DIASTOLIC BLOOD PRESSURE: 85 MMHG | HEART RATE: 90 BPM

## 2019-05-14 DIAGNOSIS — M19.071 PRIMARY OSTEOARTHRITIS, RIGHT ANKLE AND FOOT: ICD-10-CM

## 2019-05-14 PROCEDURE — 20600 DRAIN/INJ JOINT/BURSA W/O US: CPT | Mod: RT

## 2019-05-14 PROCEDURE — 99213 OFFICE O/P EST LOW 20 MIN: CPT | Mod: 25

## 2019-05-14 PROCEDURE — 73630 X-RAY EXAM OF FOOT: CPT | Mod: RT

## 2019-05-22 ENCOUNTER — RX RENEWAL (OUTPATIENT)
Age: 64
End: 2019-05-22

## 2019-05-31 ENCOUNTER — TRANSCRIPTION ENCOUNTER (OUTPATIENT)
Age: 64
End: 2019-05-31

## 2019-06-02 ENCOUNTER — TRANSCRIPTION ENCOUNTER (OUTPATIENT)
Age: 64
End: 2019-06-02

## 2019-06-03 ENCOUNTER — OUTPATIENT (OUTPATIENT)
Dept: OUTPATIENT SERVICES | Facility: HOSPITAL | Age: 64
LOS: 1 days | End: 2019-06-03
Payer: COMMERCIAL

## 2019-06-03 ENCOUNTER — APPOINTMENT (OUTPATIENT)
Dept: FAMILY MEDICINE | Facility: CLINIC | Age: 64
End: 2019-06-03
Payer: OTHER GOVERNMENT

## 2019-06-03 VITALS
WEIGHT: 210.25 LBS | BODY MASS INDEX: 36.79 KG/M2 | OXYGEN SATURATION: 95 % | RESPIRATION RATE: 14 BRPM | DIASTOLIC BLOOD PRESSURE: 84 MMHG | HEART RATE: 83 BPM | HEIGHT: 63.5 IN | SYSTOLIC BLOOD PRESSURE: 120 MMHG

## 2019-06-03 DIAGNOSIS — E66.9 OBESITY, UNSPECIFIED: ICD-10-CM

## 2019-06-03 DIAGNOSIS — Z00.00 ENCOUNTER FOR GENERAL ADULT MEDICAL EXAMINATION W/OUT ABNORMAL FINDINGS: ICD-10-CM

## 2019-06-03 DIAGNOSIS — Z87.898 PERSONAL HISTORY OF OTHER SPECIFIED CONDITIONS: ICD-10-CM

## 2019-06-03 DIAGNOSIS — K57.13 DIVERTICULITIS OF SMALL INTESTINE WITHOUT PERFORATION OR ABSCESS WITH BLEEDING: ICD-10-CM

## 2019-06-03 DIAGNOSIS — D64.9 ANEMIA, UNSPECIFIED: ICD-10-CM

## 2019-06-03 DIAGNOSIS — Z13.5 ENCOUNTER FOR SCREENING FOR EYE AND EAR DISORDERS: ICD-10-CM

## 2019-06-03 PROCEDURE — G0121: CPT

## 2019-06-03 PROCEDURE — G0438: CPT

## 2019-06-03 RX ORDER — SODIUM CHLORIDE 9 MG/ML
1000 INJECTION INTRAMUSCULAR; INTRAVENOUS; SUBCUTANEOUS
Refills: 0 | Status: DISCONTINUED | OUTPATIENT
Start: 2019-06-03 | End: 2019-06-18

## 2019-06-03 RX ORDER — OLOPATADINE HYDROCHLORIDE 7 MG/ML
0.7 SOLUTION OPHTHALMIC
Qty: 2 | Refills: 0 | Status: COMPLETED | COMMUNITY
Start: 2017-08-09 | End: 2019-06-03

## 2019-06-06 PROBLEM — Z87.898 HISTORY OF MORBID OBESITY: Status: RESOLVED | Noted: 2018-09-10 | Resolved: 2019-06-06

## 2019-06-06 PROBLEM — E66.9 OBESITY (BMI 30-39.9): Status: ACTIVE | Noted: 2019-06-06

## 2019-06-06 PROBLEM — D64.9 ANEMIA: Status: ACTIVE | Noted: 2019-05-29

## 2019-06-06 NOTE — PHYSICAL EXAM
[No Acute Distress] : no acute distress [Well Developed] : well developed [Well Nourished] : well nourished [Well-Appearing] : well-appearing [Normal Voice/Communication] : normal voice/communication [Normal Sclera/Conjunctiva] : normal sclera/conjunctiva [PERRL] : pupils equal round and reactive to light [EOMI] : extraocular movements intact [Normal Outer Ear/Nose] : the outer ears and nose were normal in appearance [Normal TMs] : both tympanic membranes were normal [Normal Oropharynx] : the oropharynx was normal [No JVD] : no jugular venous distention [No Lymphadenopathy] : no lymphadenopathy [Supple] : supple [Thyroid Normal, No Nodules] : the thyroid was normal and there were no nodules present [No Respiratory Distress] : no respiratory distress  [Clear to Auscultation] : lungs were clear to auscultation bilaterally [No Accessory Muscle Use] : no accessory muscle use [Normal Rate] : normal rate  [Regular Rhythm] : with a regular rhythm [Normal S1, S2] : normal S1 and S2 [Pedal Pulses Present] : the pedal pulses are present [No Extremity Clubbing/Cyanosis] : no extremity clubbing/cyanosis [No Edema] : there was no peripheral edema [Soft] : abdomen soft [Non Tender] : non-tender [Non-distended] : non-distended [No Masses] : no abdominal mass palpated [No HSM] : no HSM [Normal Anterior Cervical Nodes] : no anterior cervical lymphadenopathy [No Joint Swelling] : no joint swelling [Grossly Normal Strength/Tone] : grossly normal strength/tone [No Rash] : no rash [Normal Gait] : normal gait [Coordination Grossly Intact] : coordination grossly intact [No Focal Deficits] : no focal deficits [Normal Affect] : the affect was normal [Normal Insight/Judgement] : insight and judgment were intact [Normal Supraclavicular Nodes] : no supraclavicular lymphadenopathy [Normal Posterior Cervical Nodes] : no posterior cervical lymphadenopathy [Speech Grossly Normal] : speech grossly normal [Alert and Oriented x3] : oriented to person, place, and time [Normal Mood] : the mood was normal [de-identified] : patient wearing compression knee high socks, removed, no edema beneath [de-identified] : has gyn appointment scheduled [de-identified] : well-healed scar posterior neck and upper back

## 2019-06-06 NOTE — REVIEW OF SYSTEMS
[Recent Change In Weight] : ~T recent weight change [Vision Problems] : vision problems [Hearing Loss] : hearing loss [Nosebleed] : nosebleed [Nocturia] : nocturia [Diarrhea] : diarrhea [Joint Stiffness] : joint stiffness [Joint Pain] : joint pain [Joint Swelling] : joint swelling [Back Pain] : back pain [Fever] : no fever [Chills] : no chills [Fatigue] : no fatigue [Hot Flashes] : no hot flashes [Night Sweats] : no night sweats [Discharge] : no discharge [Pain] : no pain [Redness] : no redness [Dryness] : no dryness  [Itching] : no itching [Earache] : no earache [Hoarseness] : no hoarseness [Nasal Discharge] : no nasal discharge [Sore Throat] : no sore throat [Postnasal Drip] : no postnasal drip [Palpitations] : no palpitations [Chest Pain] : no chest pain [Leg Claudication] : no leg claudication [Lower Ext Edema] : no lower extremity edema [Orthopnea] : no orthopnea [Shortness Of Breath] : no shortness of breath [Paroysmal Nocturnal Dyspnea] : no paroysmal nocturnal dyspnea [Wheezing] : no wheezing [Cough] : no cough [Abdominal Pain] : no abdominal pain [Dyspnea on Exertion] : no dyspnea on exertion [Nausea] : no nausea [Vomiting] : no vomiting [Constipation] : no constipation [Heartburn] : no heartburn [Melena] : no melena [Muscle Weakness] : no muscle weakness [Muscle Pain] : no muscle pain [Mole Changes] : no mole changes [Nail Changes] : no nail changes [Hair Changes] : no hair changes [Skin Rash] : no skin rash [Headache] : no headache [Dizziness] : no dizziness [Fainting] : no fainting [Confusion] : no confusion [Memory Loss] : no memory loss [Unsteady Walking] : no ataxia [Suicidal] : not suicidal [Insomnia] : no insomnia [Anxiety] : no anxiety [Depression] : no depression [Easy Bleeding] : no easy bleeding [Easy Bruising] : no easy bruising [Swollen Glands] : no swollen glands [FreeTextEntry3] : vision: glaucoma both eyes +, next visit tomorrow with ophtho [FreeTextEntry2] : gyn: will try to go off estrogens, appt next week.  [FreeTextEntry4] : b/l hearing aids work. nosebleeds: all year round: once month [FreeTextEntry5] : foot problem still persists b/l [FreeTextEntry8] : occ nocturia, 1993 LMP [de-identified] : derm: 3 months ago [FreeTextEntry9] : lower back and neck

## 2019-06-06 NOTE — HISTORY OF PRESENT ILLNESS
[FreeTextEntry1] : Here for CPE. Undergoing stress: in processes of selling house. Feels 'off' past 1 week. Keeping track of her possible triggers for colitis, does notice certain foods trigger it. Gets bad diarrhea. Otherwise ok regular joint pains.  doing well, patient states his left eye "as good as ever will be." Was told last visit by ophthalmologist that she has b/l glaucoma, and is using drops, has f/u shortly.

## 2019-06-06 NOTE — ASSESSMENT
[FreeTextEntry1] : will repeat cbc (pt states no longer has bloody diarrhea since d/c from hospital) \par will repeat lipid panel; LDL slightly elevated, HDL excellent\par RTO 3-6 months for f/u and prn

## 2019-06-10 ENCOUNTER — APPOINTMENT (OUTPATIENT)
Dept: OBGYN | Facility: CLINIC | Age: 64
End: 2019-06-10
Payer: MEDICARE

## 2019-06-10 ENCOUNTER — RESULT REVIEW (OUTPATIENT)
Age: 64
End: 2019-06-10

## 2019-06-10 PROCEDURE — G0101: CPT

## 2019-06-12 ENCOUNTER — RX RENEWAL (OUTPATIENT)
Age: 64
End: 2019-06-12

## 2019-06-12 RX ORDER — FLUTICASONE PROPIONATE 50 UG/1
50 SPRAY, METERED NASAL DAILY
Qty: 1 | Refills: 2 | Status: ACTIVE | COMMUNITY
Start: 2017-06-05 | End: 1900-01-01

## 2019-07-26 ENCOUNTER — APPOINTMENT (OUTPATIENT)
Dept: ULTRASOUND IMAGING | Facility: HOSPITAL | Age: 64
End: 2019-07-26
Payer: OTHER GOVERNMENT

## 2019-07-26 ENCOUNTER — APPOINTMENT (OUTPATIENT)
Dept: MAMMOGRAPHY | Facility: HOSPITAL | Age: 64
End: 2019-07-26
Payer: OTHER GOVERNMENT

## 2019-07-26 ENCOUNTER — OUTPATIENT (OUTPATIENT)
Dept: OUTPATIENT SERVICES | Facility: HOSPITAL | Age: 64
LOS: 1 days | End: 2019-07-26
Payer: COMMERCIAL

## 2019-07-26 DIAGNOSIS — Z00.8 ENCOUNTER FOR OTHER GENERAL EXAMINATION: ICD-10-CM

## 2019-07-26 PROCEDURE — 76641 ULTRASOUND BREAST COMPLETE: CPT

## 2019-07-26 PROCEDURE — 76641 ULTRASOUND BREAST COMPLETE: CPT | Mod: 26,50

## 2019-07-26 PROCEDURE — 77063 BREAST TOMOSYNTHESIS BI: CPT

## 2019-07-26 PROCEDURE — 77063 BREAST TOMOSYNTHESIS BI: CPT | Mod: 26

## 2019-07-26 PROCEDURE — 77067 SCR MAMMO BI INCL CAD: CPT | Mod: 26

## 2019-07-26 PROCEDURE — 77067 SCR MAMMO BI INCL CAD: CPT

## 2019-08-16 DIAGNOSIS — N95.1 MENOPAUSAL AND FEMALE CLIMACTERIC STATES: ICD-10-CM

## 2019-08-16 DIAGNOSIS — R74.01 ELEVATION OF LEVELS OF LIVER TRANSAMINASE LEVELS: ICD-10-CM

## 2019-08-16 RX ORDER — ESTRADIOL 2 MG/1
2 TABLET ORAL DAILY
Qty: 30 | Refills: 3 | Status: ACTIVE | COMMUNITY
Start: 2019-08-16 | End: 1900-01-01

## 2019-08-23 ENCOUNTER — TRANSCRIPTION ENCOUNTER (OUTPATIENT)
Age: 64
End: 2019-08-23

## 2019-08-26 ENCOUNTER — RX RENEWAL (OUTPATIENT)
Age: 64
End: 2019-08-26

## 2019-10-11 ENCOUNTER — RX RENEWAL (OUTPATIENT)
Age: 64
End: 2019-10-11

## 2019-10-15 ENCOUNTER — APPOINTMENT (OUTPATIENT)
Dept: ORTHOPEDIC SURGERY | Facility: CLINIC | Age: 64
End: 2019-10-15
Payer: OTHER GOVERNMENT

## 2019-10-15 VITALS
WEIGHT: 210 LBS | HEART RATE: 75 BPM | BODY MASS INDEX: 36.75 KG/M2 | DIASTOLIC BLOOD PRESSURE: 79 MMHG | HEIGHT: 63.5 IN | SYSTOLIC BLOOD PRESSURE: 132 MMHG

## 2019-10-15 DIAGNOSIS — M79.671 PAIN IN RIGHT FOOT: ICD-10-CM

## 2019-10-15 PROCEDURE — 99213 OFFICE O/P EST LOW 20 MIN: CPT | Mod: 25

## 2019-10-15 PROCEDURE — 20605 DRAIN/INJ JOINT/BURSA W/O US: CPT

## 2019-10-22 ENCOUNTER — TRANSCRIPTION ENCOUNTER (OUTPATIENT)
Age: 64
End: 2019-10-22

## 2019-10-23 ENCOUNTER — APPOINTMENT (OUTPATIENT)
Dept: FAMILY MEDICINE | Facility: CLINIC | Age: 64
End: 2019-10-23
Payer: OTHER GOVERNMENT

## 2019-10-23 VITALS
HEART RATE: 76 BPM | SYSTOLIC BLOOD PRESSURE: 110 MMHG | DIASTOLIC BLOOD PRESSURE: 72 MMHG | HEIGHT: 63.5 IN | OXYGEN SATURATION: 98 % | BODY MASS INDEX: 37.62 KG/M2 | WEIGHT: 215 LBS | RESPIRATION RATE: 16 BRPM

## 2019-10-23 DIAGNOSIS — H40.9 UNSPECIFIED GLAUCOMA: ICD-10-CM

## 2019-10-23 DIAGNOSIS — Z23 ENCOUNTER FOR IMMUNIZATION: ICD-10-CM

## 2019-10-23 DIAGNOSIS — M32.9 SYSTEMIC LUPUS ERYTHEMATOSUS, UNSPECIFIED: ICD-10-CM

## 2019-10-23 DIAGNOSIS — R20.2 PARESTHESIA OF SKIN: ICD-10-CM

## 2019-10-23 PROCEDURE — 90686 IIV4 VACC NO PRSV 0.5 ML IM: CPT

## 2019-10-23 PROCEDURE — G0008: CPT

## 2019-10-23 PROCEDURE — 99214 OFFICE O/P EST MOD 30 MIN: CPT | Mod: 25

## 2019-10-23 RX ORDER — ZOSTER VACCINE RECOMBINANT, ADJUVANTED 50 MCG/0.5
50 KIT INTRAMUSCULAR
Qty: 1 | Refills: 0 | Status: ACTIVE | COMMUNITY
Start: 2019-10-23 | End: 1900-01-01

## 2019-10-23 RX ORDER — ICOSAPENT ETHYL 1000 MG/1
1 CAPSULE ORAL TWICE DAILY
Qty: 360 | Refills: 0 | Status: ACTIVE | COMMUNITY
Start: 2017-11-10 | End: 1900-01-01

## 2019-10-23 RX ORDER — LEVOTHYROXINE SODIUM 0.12 MG/1
125 TABLET ORAL DAILY
Qty: 90 | Refills: 0 | Status: ACTIVE | COMMUNITY
Start: 2018-12-19 | End: 1900-01-01

## 2019-10-27 PROBLEM — Z23 NEED FOR INFLUENZA VACCINATION: Status: ACTIVE | Noted: 2019-10-27

## 2019-10-27 NOTE — HISTORY OF PRESENT ILLNESS
[FreeTextEntry1] : Patient present for f/u visit [facility] and also states moving to [place] 11/6 with  for good! flu vax needed. Prescriptions needed. Also referrals to specialists. \par \par ROS: negative except as noted above\par

## 2019-10-27 NOTE — PHYSICAL EXAM
[No Acute Distress] : no acute distress [Well Nourished] : well nourished [Well Developed] : well developed [Well-Appearing] : well-appearing [Normal Voice/Communication] : normal voice/communication [Normal Sclera/Conjunctiva] : normal sclera/conjunctiva [Normal Outer Ear/Nose] : the outer ears and nose were normal in appearance [No JVD] : no jugular venous distention [No Respiratory Distress] : no respiratory distress  [No Accessory Muscle Use] : no accessory muscle use [Clear to Auscultation] : lungs were clear to auscultation bilaterally [Normal Rate] : normal rate  [Regular Rhythm] : with a regular rhythm [Normal S1, S2] : normal S1 and S2 [Speech Grossly Normal] : speech grossly normal [Memory Grossly Normal] : memory grossly normal [Normal Affect] : the affect was normal [Alert and Oriented x3] : oriented to person, place, and time [Normal Mood] : the mood was normal [Normal Insight/Judgement] : insight and judgment were intact

## 2019-12-17 ENCOUNTER — RX RENEWAL (OUTPATIENT)
Age: 64
End: 2019-12-17

## 2020-04-01 PROBLEM — L02.91 ABSCESS OF SKIN: Status: ACTIVE | Noted: 2018-07-24

## 2020-10-01 PROBLEM — R74.01 ELEVATED ALANINE AMINOTRANSFERASE (ALT) LEVEL: Status: RESOLVED | Noted: 2018-06-06 | Resolved: 2020-10-01

## 2023-05-23 NOTE — PROGRESS NOTE ADULT - I WAS PHYSICALLY PRESENT FOR THE KEY PORTIONS OF THE EVALUATION AND MANAGEMENT (E/M) SERVICE PROVIDED.  I AGREE WITH THE ABOVE HISTORY, PHYSICAL, AND PLAN WHICH I HAVE REVIEWED AND EDITED WHERE APPROPRIATE
What Type Of Note Output Would You Prefer (Optional)?: Standard Output
How Severe Is Your Rash?: moderate
Is This A New Presentation, Or A Follow-Up?: Rash
Statement Selected
Statement Selected

## 2023-10-06 NOTE — H&P ADULT - NSICDXPASTSURGICALHX_GEN_ALL_CORE_FT
Called patient regarding no show on 10/2 left message to give us a call back to reschedule appointment   
PAST SURGICAL HISTORY:  Dermatofibroma of back     Detached retina, right     S/P cataract surgery     S/P cervical spinal fusion     S/P  section     S/P colonoscopy     S/P hemorrhoidectomy     S/P hysterectomy with oophorectomy